# Patient Record
Sex: MALE | Race: WHITE | NOT HISPANIC OR LATINO | Employment: OTHER | URBAN - METROPOLITAN AREA
[De-identification: names, ages, dates, MRNs, and addresses within clinical notes are randomized per-mention and may not be internally consistent; named-entity substitution may affect disease eponyms.]

---

## 2023-07-11 LAB
ESTIMATED AVERAGE GLUCOSE FOR HBA1C: 197 MG/DL
HEMOGLOBIN A1C/HEMOGLOBIN TOTAL IN BLOOD: 8.5 %

## 2023-07-21 ENCOUNTER — OFFICE VISIT (OUTPATIENT)
Dept: PRIMARY CARE | Facility: CLINIC | Age: 78
End: 2023-07-21
Payer: MEDICARE

## 2023-07-21 VITALS — WEIGHT: 216 LBS | SYSTOLIC BLOOD PRESSURE: 120 MMHG | DIASTOLIC BLOOD PRESSURE: 84 MMHG | HEART RATE: 70 BPM

## 2023-07-21 DIAGNOSIS — B02.9 HERPES ZOSTER WITHOUT COMPLICATION: Primary | ICD-10-CM

## 2023-07-21 DIAGNOSIS — R21 RASH: ICD-10-CM

## 2023-07-21 PROBLEM — E78.5 HLD (HYPERLIPIDEMIA): Status: ACTIVE | Noted: 2023-07-21

## 2023-07-21 PROBLEM — I48.91 AF (ATRIAL FIBRILLATION) (MULTI): Status: ACTIVE | Noted: 2023-07-21

## 2023-07-21 PROBLEM — I10 HTN (HYPERTENSION): Status: ACTIVE | Noted: 2023-07-21

## 2023-07-21 PROBLEM — E11.9 DIABETES MELLITUS TYPE 2, UNCOMPLICATED (MULTI): Status: ACTIVE | Noted: 2023-07-21

## 2023-07-21 PROCEDURE — 3079F DIAST BP 80-89 MM HG: CPT | Performed by: STUDENT IN AN ORGANIZED HEALTH CARE EDUCATION/TRAINING PROGRAM

## 2023-07-21 PROCEDURE — 3074F SYST BP LT 130 MM HG: CPT | Performed by: STUDENT IN AN ORGANIZED HEALTH CARE EDUCATION/TRAINING PROGRAM

## 2023-07-21 PROCEDURE — 99214 OFFICE O/P EST MOD 30 MIN: CPT | Performed by: STUDENT IN AN ORGANIZED HEALTH CARE EDUCATION/TRAINING PROGRAM

## 2023-07-21 RX ORDER — APIXABAN 5 MG/1
5 TABLET, FILM COATED ORAL 2 TIMES DAILY
COMMUNITY

## 2023-07-21 RX ORDER — LANCETS 30 GAUGE
EACH MISCELLANEOUS
COMMUNITY
Start: 2023-03-16

## 2023-07-21 RX ORDER — BLOOD-GLUCOSE METER
EACH MISCELLANEOUS
COMMUNITY
Start: 2023-03-16

## 2023-07-21 RX ORDER — LANCETS 33 GAUGE
EACH MISCELLANEOUS
COMMUNITY
Start: 2023-07-10

## 2023-07-21 RX ORDER — METOPROLOL TARTRATE 25 MG/1
12.5 TABLET, FILM COATED ORAL 2 TIMES DAILY
COMMUNITY
End: 2023-11-28

## 2023-07-21 RX ORDER — INSULIN LISPRO 100 [IU]/ML
INJECTION, SUSPENSION SUBCUTANEOUS
COMMUNITY
End: 2024-05-02 | Stop reason: SDUPTHER

## 2023-07-21 RX ORDER — EMPAGLIFLOZIN 25 MG/1
1 TABLET, FILM COATED ORAL DAILY
COMMUNITY
Start: 2021-11-10 | End: 2024-03-25 | Stop reason: SDUPTHER

## 2023-07-21 RX ORDER — SIMVASTATIN 40 MG/1
TABLET, FILM COATED ORAL
COMMUNITY
Start: 2022-07-05 | End: 2023-10-16 | Stop reason: SDUPTHER

## 2023-07-21 RX ORDER — LISINOPRIL AND HYDROCHLOROTHIAZIDE 20; 25 MG/1; MG/1
TABLET ORAL
COMMUNITY
Start: 2023-07-21 | End: 2023-10-16 | Stop reason: SDUPTHER

## 2023-07-21 RX ORDER — VALACYCLOVIR HYDROCHLORIDE 1 G/1
1000 TABLET, FILM COATED ORAL 3 TIMES DAILY
Qty: 21 TABLET | Refills: 0 | Status: SHIPPED | OUTPATIENT
Start: 2023-07-21 | End: 2023-07-28

## 2023-07-21 RX ORDER — METFORMIN HYDROCHLORIDE 850 MG/1
850 TABLET ORAL 2 TIMES DAILY
COMMUNITY
End: 2024-04-25 | Stop reason: SDUPTHER

## 2023-07-21 RX ORDER — DIGOXIN 125 MCG
125 TABLET ORAL DAILY
COMMUNITY
End: 2024-01-18 | Stop reason: WASHOUT

## 2023-07-21 RX ORDER — DEXAMETHASONE 6 MG/1
TABLET ORAL DAILY
COMMUNITY
Start: 2022-08-12 | End: 2024-01-18 | Stop reason: WASHOUT

## 2023-07-21 RX ORDER — AMLODIPINE BESYLATE 5 MG/1
5 TABLET ORAL DAILY
COMMUNITY
End: 2023-10-03

## 2023-07-21 ASSESSMENT — PATIENT HEALTH QUESTIONNAIRE - PHQ9
1. LITTLE INTEREST OR PLEASURE IN DOING THINGS: NOT AT ALL
2. FEELING DOWN, DEPRESSED OR HOPELESS: NOT AT ALL
1. LITTLE INTEREST OR PLEASURE IN DOING THINGS: NOT AT ALL
SUM OF ALL RESPONSES TO PHQ9 QUESTIONS 1 AND 2: 0
2. FEELING DOWN, DEPRESSED OR HOPELESS: NOT AT ALL
SUM OF ALL RESPONSES TO PHQ9 QUESTIONS 1 AND 2: 0

## 2023-07-21 NOTE — PROGRESS NOTES
Subjective   Patient ID: Sean Khanna is a 77 y.o. male who presents for Rash (Possible shingles on left hip).    HPI   Patient is presenting to the office today to discuss signs/symptoms of what he believes is shingles    Stated over the past 48 hours, Wednesday night, he noted a slight rash starting to occur on his left lateral hip  Ultimately within the past 24-48 hours he has been noticing this becomes slightly vesicular, erythematous, and all noted within a few groups/vesicles    He is coming to the office today to discuss this further and now wondering if this could even be shingles and asking to further evaluate/treated    Review of Systems  All pertinent positive symptoms are included in the history of present illness.    All other systems have been reviewed and are negative and noncontributory to this patient's current ailments.    Objective   /84   Pulse 70   Wt 98 kg (216 lb)     Physical Exam  CONSTITUTIONAL - well nourished, well developed, looks like stated age, in no acute distress, not ill-appearing, and not tired appearing  SKIN - normal skin color and pigmentation, noted an erythematous and vesicular rash all located on 1 dermatome on the left lateral portion of hip, no open lesions at this time  HEAD - no trauma, normocephalic  EYES - normal external exam  CHEST -no distressed breathing, good effort  EXTREMITIES - no edema, no deformities  NEUROLOGICAL - normal balance, normal motor, no ataxia  PSYCHIATRIC - alert, pleasant and cordial, age-appropriate    Assessment/Plan   After examination this does appear to be shingles    You are contagious to those who have not yet had chickenpox, but shingles cannot be passed to other people    I would try to avoid contact with those who have not yet been vaccinated against chickenpox or who have not yet had the chickenpox disease until the shingles lesions scab over, at which point contagiousness will not be an issue; this is usually about 5-7 days  after noticing the rash for the first time    Risks, benefits, and options of treatment(s) were discussed and we opted for the treatment as noted above with instructions on the medication use for underlying medical ailment(s)    Today, I am going to provide you Valtrex to be used for the next 7 days to help encapsulate the virus and potentially decrease postherpetic neuralgia, or the pain that can be related to this type of infection    I would highly encourage supportive care such as rest, fluids and pain medication as warranted    Please return to the office in 7-10 days or sooner if symptoms worsen or persist as we will then further evaluate based on your symptoms

## 2023-07-26 ENCOUNTER — TELEPHONE (OUTPATIENT)
Dept: PRIMARY CARE | Facility: CLINIC | Age: 78
End: 2023-07-26
Payer: MEDICARE

## 2023-07-26 NOTE — TELEPHONE ENCOUNTER
Pt called in to let us know he did have a bowel movement and will let us know if anything changes.

## 2023-07-26 NOTE — TELEPHONE ENCOUNTER
Pt called in and has had what he thinks is an impacted colon since Sunday with an onset of severe bloating. Has only been able to pass gas but no bowel movement even after trying a laxative yesterday. Spoke with the pt about trying prune juice, miralax, stool softener and increase water intake. I did inform him if he does not have a bowel movement by Friday we would like him to go to the ER. He will call us back to update us on this situation.

## 2023-07-27 ENCOUNTER — TELEPHONE (OUTPATIENT)
Dept: PRIMARY CARE | Facility: CLINIC | Age: 78
End: 2023-07-27
Payer: MEDICARE

## 2023-07-27 NOTE — TELEPHONE ENCOUNTER
----- Message from Faviola Lombardo DO sent at 7/27/2023  1:14 PM EDT -----  Unfortunately, he will have to continue doing what he is doing as we are very limited since I put him on the medication for the shingles as well as due to his current medical history    I would recommend he continues trying to monitor and we will follow-up closely  ----- Message -----  From: Divina Kimball MA  Sent: 7/27/2023  12:05 PM EDT  To: Faviola Lombardo DO    Pt called in to let us know he had a dry heaving spell Sunday when trying to make a bowel movement. Due to this he strained his muscles pretty bad, has tried tylenol, muscle relaxer and a topical and has gotten no relief. Would you like to see him back in the office for this? He was just in on 7/21/23 for an unrelated topic.

## 2023-09-12 RX ORDER — INSULIN LISPRO 100 [IU]/ML
INJECTION, SUSPENSION SUBCUTANEOUS
Qty: 3 ML | Refills: 1 | OUTPATIENT
Start: 2023-09-12

## 2023-09-12 NOTE — TELEPHONE ENCOUNTER
I actually declined this medication as this patient sees Dr. Novak and should most likely get this from him as he just saw the patient back in July    Thank you

## 2023-09-23 DIAGNOSIS — I10 ESSENTIAL (PRIMARY) HYPERTENSION: ICD-10-CM

## 2023-10-03 RX ORDER — AMLODIPINE BESYLATE 5 MG/1
5 TABLET ORAL DAILY
Qty: 90 TABLET | Refills: 0 | Status: SHIPPED | OUTPATIENT
Start: 2023-10-03 | End: 2023-10-16 | Stop reason: SDUPTHER

## 2023-10-14 DIAGNOSIS — E78.5 HYPERLIPIDEMIA, UNSPECIFIED: ICD-10-CM

## 2023-10-16 ENCOUNTER — LAB (OUTPATIENT)
Dept: LAB | Facility: LAB | Age: 78
End: 2023-10-16
Payer: MEDICARE

## 2023-10-16 ENCOUNTER — ANCILLARY PROCEDURE (OUTPATIENT)
Dept: RADIOLOGY | Facility: CLINIC | Age: 78
End: 2023-10-16
Payer: MEDICARE

## 2023-10-16 ENCOUNTER — OFFICE VISIT (OUTPATIENT)
Dept: PRIMARY CARE | Facility: CLINIC | Age: 78
End: 2023-10-16
Payer: MEDICARE

## 2023-10-16 VITALS
SYSTOLIC BLOOD PRESSURE: 122 MMHG | DIASTOLIC BLOOD PRESSURE: 66 MMHG | HEIGHT: 71 IN | HEART RATE: 55 BPM | WEIGHT: 224 LBS | BODY MASS INDEX: 31.36 KG/M2

## 2023-10-16 DIAGNOSIS — Z79.4 TYPE 2 DIABETES MELLITUS WITHOUT COMPLICATION, WITH LONG-TERM CURRENT USE OF INSULIN (MULTI): ICD-10-CM

## 2023-10-16 DIAGNOSIS — I48.20 CHRONIC ATRIAL FIBRILLATION (MULTI): Primary | ICD-10-CM

## 2023-10-16 DIAGNOSIS — I48.20 CHRONIC ATRIAL FIBRILLATION (MULTI): ICD-10-CM

## 2023-10-16 DIAGNOSIS — E78.2 MIXED HYPERLIPIDEMIA: ICD-10-CM

## 2023-10-16 DIAGNOSIS — I10 PRIMARY HYPERTENSION: ICD-10-CM

## 2023-10-16 DIAGNOSIS — G89.29 CHRONIC RIGHT SHOULDER PAIN: ICD-10-CM

## 2023-10-16 DIAGNOSIS — I10 ESSENTIAL (PRIMARY) HYPERTENSION: ICD-10-CM

## 2023-10-16 DIAGNOSIS — E11.9 TYPE 2 DIABETES MELLITUS WITHOUT COMPLICATION, WITH LONG-TERM CURRENT USE OF INSULIN (MULTI): ICD-10-CM

## 2023-10-16 DIAGNOSIS — M25.511 CHRONIC RIGHT SHOULDER PAIN: ICD-10-CM

## 2023-10-16 LAB
ALBUMIN SERPL BCP-MCNC: 4.3 G/DL (ref 3.4–5)
ALP SERPL-CCNC: 82 U/L (ref 33–136)
ALT SERPL W P-5'-P-CCNC: 19 U/L (ref 10–52)
ANION GAP SERPL CALC-SCNC: 14 MMOL/L (ref 10–20)
AST SERPL W P-5'-P-CCNC: 16 U/L (ref 9–39)
BILIRUB SERPL-MCNC: 0.6 MG/DL (ref 0–1.2)
BUN SERPL-MCNC: 36 MG/DL (ref 6–23)
CALCIUM SERPL-MCNC: 9.8 MG/DL (ref 8.6–10.3)
CHLORIDE SERPL-SCNC: 103 MMOL/L (ref 98–107)
CO2 SERPL-SCNC: 28 MMOL/L (ref 21–32)
CREAT SERPL-MCNC: 1.41 MG/DL (ref 0.5–1.3)
EST. AVERAGE GLUCOSE BLD GHB EST-MCNC: 166 MG/DL
GFR SERPL CREATININE-BSD FRML MDRD: 52 ML/MIN/1.73M*2
GLUCOSE SERPL-MCNC: 90 MG/DL (ref 74–99)
HBA1C MFR BLD: 7.4 %
POTASSIUM SERPL-SCNC: 5.4 MMOL/L (ref 3.5–5.3)
PROT SERPL-MCNC: 7.4 G/DL (ref 6.4–8.2)
SODIUM SERPL-SCNC: 140 MMOL/L (ref 136–145)

## 2023-10-16 PROCEDURE — 73030 X-RAY EXAM OF SHOULDER: CPT | Mod: RIGHT SIDE | Performed by: RADIOLOGY

## 2023-10-16 PROCEDURE — 80053 COMPREHEN METABOLIC PANEL: CPT

## 2023-10-16 PROCEDURE — 1159F MED LIST DOCD IN RCRD: CPT | Performed by: STUDENT IN AN ORGANIZED HEALTH CARE EDUCATION/TRAINING PROGRAM

## 2023-10-16 PROCEDURE — 80061 LIPID PANEL: CPT

## 2023-10-16 PROCEDURE — 3078F DIAST BP <80 MM HG: CPT | Performed by: STUDENT IN AN ORGANIZED HEALTH CARE EDUCATION/TRAINING PROGRAM

## 2023-10-16 PROCEDURE — 3074F SYST BP LT 130 MM HG: CPT | Performed by: STUDENT IN AN ORGANIZED HEALTH CARE EDUCATION/TRAINING PROGRAM

## 2023-10-16 PROCEDURE — 99214 OFFICE O/P EST MOD 30 MIN: CPT | Performed by: STUDENT IN AN ORGANIZED HEALTH CARE EDUCATION/TRAINING PROGRAM

## 2023-10-16 PROCEDURE — 36415 COLL VENOUS BLD VENIPUNCTURE: CPT

## 2023-10-16 PROCEDURE — 83036 HEMOGLOBIN GLYCOSYLATED A1C: CPT

## 2023-10-16 PROCEDURE — 73030 X-RAY EXAM OF SHOULDER: CPT | Mod: RT

## 2023-10-16 RX ORDER — AMLODIPINE BESYLATE 5 MG/1
5 TABLET ORAL DAILY
Qty: 90 TABLET | Refills: 1 | Status: SHIPPED | OUTPATIENT
Start: 2023-10-16 | End: 2024-02-13 | Stop reason: SDUPTHER

## 2023-10-16 RX ORDER — SIMVASTATIN 40 MG/1
40 TABLET, FILM COATED ORAL NIGHTLY
Qty: 90 TABLET | Refills: 1 | Status: SHIPPED | OUTPATIENT
Start: 2023-10-16 | End: 2024-02-13 | Stop reason: SDUPTHER

## 2023-10-16 RX ORDER — SIMVASTATIN 40 MG/1
40 TABLET, FILM COATED ORAL NIGHTLY
Qty: 90 TABLET | Refills: 1 | OUTPATIENT
Start: 2023-10-16

## 2023-10-16 RX ORDER — LISINOPRIL AND HYDROCHLOROTHIAZIDE 20; 25 MG/1; MG/1
1 TABLET ORAL DAILY
Qty: 90 TABLET | Refills: 1 | Status: SHIPPED | OUTPATIENT
Start: 2023-10-16 | End: 2024-02-13 | Stop reason: SDUPTHER

## 2023-10-16 ASSESSMENT — PATIENT HEALTH QUESTIONNAIRE - PHQ9
1. LITTLE INTEREST OR PLEASURE IN DOING THINGS: NOT AT ALL
2. FEELING DOWN, DEPRESSED OR HOPELESS: NOT AT ALL
SUM OF ALL RESPONSES TO PHQ9 QUESTIONS 1 AND 2: 0

## 2023-10-16 NOTE — PROGRESS NOTES
Subjective   Patient ID: Sean Khanna is a 77 y.o. male who presents for 6 month follow up  Today he is accompanied by alone.     HPI     1. Diabetes Mellitus Type II   Takes Jardiance 25 mg, Metformin 850 twice a day, and humalog   Does not monitor blood sugars at home  He will follow-up with endocrinology to manage his diabetes starting next month  Denies polyuria, polydipsia, numbness or tingling      2. Atrial Fibrillation  Discontinue digoxin 125 mcg but continues to take metoprolol 12.5 mg twice a day   Follows with Dr. Ivory (cardiology), [facility]  Denies chest pain, chest pressure, heart palpitations or any other acute cardiopulmonary symptoms      3. Hyperlipidemia   Takes Simvastatin 40 mg   Denies chest pain, chest pressure, shortness of breath.  Willing and wishing get lab work performed     4. Hypertension   Takes Metoprolol 12.5 mg twice a day, amlodipine 5 mg, and Lisinopril-HCTZ 20-25 mg   Blood pressure is stable in office at 122/66  Denies chest pain, lightheadedness, dizziness or any other acute cardiopulmonary symptoms     5.  Right shoulder pain  Has a longstanding history of right-sided shoulder pain  States that when he exercises and certain motions he feels very well  Ultimately, notices pain worsen at night when he is sleeping  Wish to discuss this further and even wondering if imaging would be appropriate      Current Outpatient Medications on File Prior to Visit   Medication Sig Dispense Refill    dexAMETHasone (Decadron) 6 mg tablet Take by mouth once daily.      digoxin (Lanoxin) 125 MCG tablet Take 1 tablet (125 mcg) by mouth once daily.      Eliquis 5 mg tablet Take 1 tablet (5 mg) by mouth 2 times a day.      HumaLOG Mix 75-25 KwikPen 100 unit/mL (75-25) injection INJECT 42 UNIT TWICE DAILY      Jardiance 25 mg Take 1 tablet (25 mg) by mouth once daily.      metFORMIN (Glucophage) 850 mg tablet Take 1 tablet (850 mg) by mouth 2 times a day.      metoprolol tartrate  "(Lopressor) 25 mg tablet Take 0.5 tablets (12.5 mg) by mouth 2 times a day.      OneTouch Delica Plus Lancet 30 gauge misc FOR GLUCOSE TESTING 3 TIMES DAILY      OneTouch Verio Flex meter misc FOR GLUCOSE TESTING 3 TIMES DAILY      OneTouch Verio test strips strip TEST 3 TIMES DAILY.      [DISCONTINUED] amLODIPine (Norvasc) 5 mg tablet TAKE 1 TABLET BY MOUTH EVERY DAY 90 tablet 0    [DISCONTINUED] lisinopriL-hydrochlorothiazide 20-25 mg tablet       [DISCONTINUED] simvastatin (Zocor) 40 mg tablet Take by mouth.       No current facility-administered medications on file prior to visit.        No Known Allergies    Immunization History   Administered Date(s) Administered    Influenza, Seasonal, Quadrivalent, Adjuvanted 10/02/2023    Influenza, seasonal, injectable 01/02/2023    Moderna COVID-19 vaccine, bivalent, blue cap/gray label *Check age/dose* 11/10/2022    Moderna SARS-CoV-2 Vaccination 02/22/2021, 03/22/2021, 11/16/2021    Pfizer COVID-19 vaccine, Fall 2023, 12 years and older, (30mcg/0.3mL) 10/05/2023    Pfizer Purple Cap SARS-CoV-2 10/05/2023    Pneumococcal conjugate vaccine, 13-valent (PREVNAR 13) 10/02/2018    Pneumococcal polysaccharide vaccine, 23-valent, age 2 years and older (PNEUMOVAX 23) 12/10/2019         Review of Systems  All pertinent positive symptoms are included in the history of present illness.  All other systems have been reviewed and are negative and noncontributory to this patient's current ailments.     Objective   /66 (BP Location: Left arm, Patient Position: Sitting, BP Cuff Size: Adult)   Pulse 55   Ht 1.803 m (5' 11\")   Wt 102 kg (224 lb)   BMI 31.24 kg/m²   BSA: 2.26 meters squared  No visits with results within 1 Month(s) from this visit.   Latest known visit with results is:   Orders Only on 07/10/2023   Component Date Value Ref Range Status    Hemoglobin A1C 07/10/2023 8.5 (A)  % Final    Comment:      Diagnosis of Diabetes-Adults   Non-Diabetic: < or = 5.6%   " Increased risk for developing diabetes: 5.7-6.4%   Diagnostic of diabetes: > or = 6.5%  .       Monitoring of Diabetes                Age (y)     Therapeutic Goal (%)   Adults:          >18           <7.0   Pediatrics:    13-18           <7.5                   7-12           <8.0                   0- 6            7.5-8.5   American Diabetes Association. Diabetes Care 33(S1), Jan 2010.      Estimated Average Glucose 07/10/2023 197  MG/DL Final       Physical Exam  CONSTITUTIONAL - well nourished, well developed, looks like stated age, in no acute distress, not ill-appearing, and not tired appearing  SKIN - normal skin color and pigmentation  HEAD - no trauma, normocephalic  EYES - pupils are equal and reactive to light  NECK - supple without rigidity  CHEST - clear to auscultation, no wheezing, no crackles and no rales, good effort  CARDIAC - regular rate and regular rhythm, no skipped beats, no murmur  ABDOMEN - no organomegaly, soft, nontender, nondistended  EXTREMITIES - no edema, no deformities  NEUROLOGICAL - normal gait, normal balance, normal motor  PSYCHIATRIC - alert, pleasant and cordial, age-appropriate  IMMUNOLOGIC - no cervical lymphadenopathy     Assessment/Plan   1. Diabetes Mellitus Type II   Continue Jardiance 25 mg, Metformin 850 twice a day, and humalog  Continue to follow up with endocrinologist  We also ordered a hemoglobin A1c and we will notify of the results and make recommendations accordingly     2. Atrial Fibrillation  Continue Metoprolol 12.5 mg twice a day   Follow with Dr. Ivory as scheduled  I would like to have you monitor and record blood pressures at home   Blood pressure goal should be below 130/80, ideally 120/80  I also prefer that you get your digoxin from your cardiologist in the future     3. Hyperlipidemia   Continue Simvastatin 40 mg   I have provided you with a requisition for a lipid panel   I will notify you of the results and make treatment recommendations accordingly       4. Hypertension   Continue Metoprolol 12.5 mg twice a day, amlodipine 5 mg and Lisinopril-HCTZ 20-25 mg   I would like to have you monitor and record blood pressures at home   Blood pressure goal should be below 130/80, ideally 120/80    5.  Right shoulder pain  Continue exercising as tolerated  I did order an x-ray and will notify the results and make corrections accordingly  Otherwise, I would also recommend that you continue monitoring if this worsens we will discuss a cortisone injection and/or a physical therapy referral     Please follow up with the office for any further questions or concerns  Otherwise, please follow-up in 6 months for continued care as well as your Medicare annual wellness examination

## 2023-10-17 ENCOUNTER — LAB (OUTPATIENT)
Dept: LAB | Facility: LAB | Age: 78
End: 2023-10-17
Payer: MEDICARE

## 2023-10-17 DIAGNOSIS — E87.5 HYPERKALEMIA: Primary | ICD-10-CM

## 2023-10-17 DIAGNOSIS — E87.5 HYPERKALEMIA: ICD-10-CM

## 2023-10-17 LAB
ALBUMIN SERPL BCP-MCNC: 4.2 G/DL (ref 3.4–5)
ALP SERPL-CCNC: 83 U/L (ref 33–136)
ALT SERPL W P-5'-P-CCNC: 16 U/L (ref 10–52)
ANION GAP SERPL CALC-SCNC: 16 MMOL/L (ref 10–20)
AST SERPL W P-5'-P-CCNC: 14 U/L (ref 9–39)
BILIRUB SERPL-MCNC: 0.8 MG/DL (ref 0–1.2)
BUN SERPL-MCNC: 31 MG/DL (ref 6–23)
CALCIUM SERPL-MCNC: 9.4 MG/DL (ref 8.6–10.3)
CHLORIDE SERPL-SCNC: 100 MMOL/L (ref 98–107)
CHOLEST SERPL-MCNC: 111 MG/DL
CHOLESTEROL/HDL RATIO: 1.9
CO2 SERPL-SCNC: 26 MMOL/L (ref 21–32)
CREAT SERPL-MCNC: 1.37 MG/DL (ref 0.5–1.3)
GFR SERPL CREATININE-BSD FRML MDRD: 53 ML/MIN/1.73M*2
GLUCOSE SERPL-MCNC: 135 MG/DL (ref 74–99)
HDLC SERPL-MCNC: 59.5 MG/DL
LDLC SERPL CALC-MCNC: 39 MG/DL
NON HDL CHOLESTEROL: 52 MG/DL (ref 0–149)
POTASSIUM SERPL-SCNC: 4.5 MMOL/L (ref 3.5–5.3)
PROT SERPL-MCNC: 7.6 G/DL (ref 6.4–8.2)
SODIUM SERPL-SCNC: 137 MMOL/L (ref 136–145)
TRIGL SERPL-MCNC: 61 MG/DL
VLDL: 12 MG/DL (ref 0–40)

## 2023-10-17 PROCEDURE — 80053 COMPREHEN METABOLIC PANEL: CPT

## 2023-10-17 PROCEDURE — 36415 COLL VENOUS BLD VENIPUNCTURE: CPT

## 2023-10-17 NOTE — RESULT ENCOUNTER NOTE
Potassium slightly elevated 5.4 and also kidney function slightly decreased at 52%  BUN and creatinine both elevated at 36 and 1.41 respectively    This is pointing towards dehydration    Liver function is within normal limits    Hemoglobin A1c is improving down to 7.4% from 8.5%    Lipid panel looks great at 111, HDL 59, LDL 39, triglycerides 61    Due to the potassium level noted above, and due to his heart history, I would recommend he repeat a CMP within the next few days

## 2023-10-18 ENCOUNTER — TELEPHONE (OUTPATIENT)
Dept: PRIMARY CARE | Facility: CLINIC | Age: 78
End: 2023-10-18
Payer: MEDICARE

## 2023-10-18 NOTE — RESULT ENCOUNTER NOTE
X-ray of right shoulder showed mild AC joint osteoarthrosis but otherwise unremarkable right shoulder    The next step would be discussing a cortisone injection or even physical therapy    Please advise

## 2023-10-18 NOTE — TELEPHONE ENCOUNTER
----- Message from Faviola Lombardo DO sent at 10/17/2023  4:43 PM EDT -----  Potassium well within normal limits and kidney function look stable      Spoke w/pt, pt aware of results

## 2023-10-18 NOTE — TELEPHONE ENCOUNTER
----- Message from Faviola Lombardo DO sent at 10/18/2023  6:37 AM EDT -----  X-ray of right shoulder showed mild AC joint osteoarthrosis but otherwise unremarkable right shoulder    The next step would be discussing a cortisone injection or even physical therapy    Please advise    Spoke w/pt, pt aware of results

## 2023-10-31 ENCOUNTER — APPOINTMENT (OUTPATIENT)
Dept: DERMATOLOGY | Facility: CLINIC | Age: 78
End: 2023-10-31
Payer: MEDICARE

## 2023-11-06 ENCOUNTER — OFFICE VISIT (OUTPATIENT)
Dept: DERMATOLOGY | Facility: CLINIC | Age: 78
End: 2023-11-06
Payer: MEDICARE

## 2023-11-06 DIAGNOSIS — L72.0 EPIDERMAL CYST: ICD-10-CM

## 2023-11-06 DIAGNOSIS — L85.8 CUTANEOUS HORN: ICD-10-CM

## 2023-11-06 PROCEDURE — 11104 PUNCH BX SKIN SINGLE LESION: CPT | Performed by: SPECIALIST

## 2023-11-06 PROCEDURE — 3074F SYST BP LT 130 MM HG: CPT | Performed by: SPECIALIST

## 2023-11-06 PROCEDURE — 88305 TISSUE EXAM BY PATHOLOGIST: CPT | Performed by: DERMATOLOGY

## 2023-11-06 PROCEDURE — 88304 TISSUE EXAM BY PATHOLOGIST: CPT | Performed by: DERMATOLOGY

## 2023-11-06 PROCEDURE — 3078F DIAST BP <80 MM HG: CPT | Performed by: SPECIALIST

## 2023-11-06 PROCEDURE — 88304 TISSUE EXAM BY PATHOLOGIST: CPT | Mod: TC,DER | Performed by: SPECIALIST

## 2023-11-06 PROCEDURE — 1159F MED LIST DOCD IN RCRD: CPT | Performed by: SPECIALIST

## 2023-11-06 PROCEDURE — 11300 SHAVE SKIN LESION 0.5 CM/<: CPT | Performed by: SPECIALIST

## 2023-11-06 NOTE — PROGRESS NOTES
Subjective   HPI: Sean Khanna is a 77 y.o. male is here for evaluation and treatment of a spot on my back and a spot on my lip.    ROS: No other skin or systemic complaints other than what is documented elsewhere in the note.    ALLERGIES: Patient has no known allergies.    SOCIAL:  reports that he has never smoked. He has never used smokeless tobacco. No history on file for alcohol use and drug use.    Objective     Description: Patient has an inflamed crusted plaque in a heavily photodamaged area left upper back.  Diagnostic considerations would include irritated seborrheic keratosis or squamous cell carcinoma.  Patient also has apartially inflamed cyst involving his right upper lip.    Assessment/Plan   1. Epidermal cyst  Right Upper Cutaneous Lip    Skin biopsy - Right Upper Cutaneous Lip    Specimen 1 - Dermatopathology- DERM LAB  Differential Diagnosis: Epidermal Cyst   Check Margins Yes/No?:    Comments:    Dermpath Lab: Routine Histopathology (formalin-fixed tissue)    2. Cutaneous horn  Left Posterior Shoulder    Shave removal - Left Posterior Shoulder    Specimen 2 - Dermatopathology- DERM LAB  Differential Diagnosis: Cutaneous Horn   Check Margins Yes/No?:    Comments:    Dermpath Lab: Routine Histopathology (formalin-fixed tissue)         FOLLOW UP: 1 week for suture removal.    The patient was encouraged to contact me with any further questions or concerns.  Edmund Vail MD  11/6/2023

## 2023-11-06 NOTE — PROGRESS NOTES
Subjective     Sean Khanna is a 77 y.o. male who presents for the following: Cyst (Right Upper Lip /).     Review of Systems:  No other skin or systemic complaints other than what is documented elsewhere in the note.    The following portions of the chart were reviewed this encounter and updated as appropriate:          Skin Cancer History  No skin cancer on file.      Specialty Problems    None       Objective   Well appearing patient in no apparent distress; mood and affect are within normal limits.    A focused skin examination was performed. All findings within normal limits unless otherwise noted below.    Assessment/Plan   1. Epidermal cyst  Right Upper Cutaneous Lip    Skin biopsy - Right Upper Cutaneous Lip  Type of biopsy: punch    Informed consent: discussed and consent obtained    Timeout: patient name, date of birth, surgical site, and procedure verified    Procedure prep:  Patient was prepped and draped  Anesthesia: the lesion was anesthetized in a standard fashion    Anesthetic:  1% lidocaine w/ epinephrine 1-100,000 local infiltration  Punch size:  4 mm  Suture size:  4-0  Suture type: Prolene (polypropylene)    Suture removal (days):  14  Hemostasis achieved with: suture    Outcome: patient tolerated procedure well    Post-procedure details: sterile dressing applied and wound care instructions given    Dressing type: bandage and petrolatum      Specimen 1 - Dermatopathology- DERM LAB  Differential Diagnosis: Epidermal Cyst   Check Margins Yes/No?:    Comments:    Dermpath Lab: Routine Histopathology (formalin-fixed tissue)    2. Cutaneous horn  Left Posterior Shoulder    Shave removal - Left Posterior Shoulder    Informed consent: discussed and consent obtained    Timeout: patient name, date of birth, surgical site, and procedure verified    Procedure prep:  Patient was prepped and draped  Anesthesia: the lesion was anesthetized in a standard fashion    Anesthetic:  1% lidocaine w/ epinephrine  1-100,000 local infiltration  Instrument used: #15 blade    Hemostasis achieved with: aluminum chloride    Outcome: patient tolerated procedure well    Post-procedure details: sterile dressing applied and wound care instructions given    Dressing type: bandage and petrolatum      Specimen 2 - Dermatopathology- DERM LAB  Differential Diagnosis: Cutaneous Horn   Check Margins Yes/No?:    Comments:    Dermpath Lab: Routine Histopathology (formalin-fixed tissue)

## 2023-11-08 LAB
LABORATORY COMMENT REPORT: NORMAL
PATH REPORT.FINAL DX SPEC: NORMAL
PATH REPORT.GROSS SPEC: NORMAL
PATH REPORT.MICROSCOPIC SPEC OTHER STN: NORMAL
PATH REPORT.RELEVANT HX SPEC: NORMAL
PATH REPORT.TOTAL CANCER: NORMAL

## 2023-11-11 DIAGNOSIS — I48.91 UNSPECIFIED ATRIAL FIBRILLATION (MULTI): ICD-10-CM

## 2023-11-13 ENCOUNTER — OFFICE VISIT (OUTPATIENT)
Dept: DERMATOLOGY | Facility: CLINIC | Age: 78
End: 2023-11-13
Payer: MEDICARE

## 2023-11-13 DIAGNOSIS — Z48.02 VISIT FOR SUTURE REMOVAL: ICD-10-CM

## 2023-11-13 PROCEDURE — 1159F MED LIST DOCD IN RCRD: CPT | Performed by: SPECIALIST

## 2023-11-13 PROCEDURE — 3078F DIAST BP <80 MM HG: CPT | Performed by: SPECIALIST

## 2023-11-13 PROCEDURE — 3074F SYST BP LT 130 MM HG: CPT | Performed by: SPECIALIST

## 2023-11-13 NOTE — PROGRESS NOTES
Subjective   HPI: Sean Khanna is a 77 y.o. male is here for suture removal.    ROS: No other skin or systemic complaints other than what is documented elsewhere in the note.    ALLERGIES: Patient has no known allergies.    SOCIAL:  reports that he has never smoked. He has never used smokeless tobacco. No history on file for alcohol use and drug use.    Objective     Description: Sutures removed.  This wound is healing very nicely.  There is no sign of infection.    Assessment/Plan   1. Visit for suture removal  Right Upper Cutaneous Lip         FOLLOW UP: As needed.    The patient was encouraged to contact me with any further questions or concerns.  Edmund Vail MD  11/13/2023

## 2023-11-26 DIAGNOSIS — E11.9 TYPE 2 DIABETES MELLITUS WITHOUT COMPLICATIONS (MULTI): ICD-10-CM

## 2023-11-28 RX ORDER — METOPROLOL TARTRATE 25 MG/1
12.5 TABLET, FILM COATED ORAL 2 TIMES DAILY
Qty: 90 TABLET | Refills: 1 | Status: SHIPPED | OUTPATIENT
Start: 2023-11-28 | End: 2024-02-13 | Stop reason: SDUPTHER

## 2023-11-28 RX ORDER — PEN NEEDLE, DIABETIC 32GX 5/32"
NEEDLE, DISPOSABLE MISCELLANEOUS
Qty: 100 EACH | Refills: 1 | Status: SHIPPED | OUTPATIENT
Start: 2023-11-28 | End: 2023-12-22

## 2023-12-21 DIAGNOSIS — E11.9 TYPE 2 DIABETES MELLITUS WITHOUT COMPLICATIONS (MULTI): ICD-10-CM

## 2023-12-22 RX ORDER — PEN NEEDLE, DIABETIC 32GX 5/32"
NEEDLE, DISPOSABLE MISCELLANEOUS
Qty: 300 EACH | Refills: 1 | Status: SHIPPED | OUTPATIENT
Start: 2023-12-22

## 2024-01-18 ENCOUNTER — OFFICE VISIT (OUTPATIENT)
Dept: DERMATOLOGY | Facility: CLINIC | Age: 79
End: 2024-01-18
Payer: MEDICARE

## 2024-01-18 ENCOUNTER — LAB (OUTPATIENT)
Dept: LAB | Facility: LAB | Age: 79
End: 2024-01-18
Payer: MEDICARE

## 2024-01-18 ENCOUNTER — OFFICE VISIT (OUTPATIENT)
Dept: ENDOCRINOLOGY | Facility: CLINIC | Age: 79
End: 2024-01-18
Payer: MEDICARE

## 2024-01-18 VITALS
HEIGHT: 73 IN | HEART RATE: 57 BPM | WEIGHT: 241.4 LBS | SYSTOLIC BLOOD PRESSURE: 159 MMHG | RESPIRATION RATE: 20 BRPM | DIASTOLIC BLOOD PRESSURE: 77 MMHG | BODY MASS INDEX: 31.99 KG/M2

## 2024-01-18 DIAGNOSIS — E11.9 TYPE 2 DIABETES MELLITUS WITHOUT COMPLICATION, WITH LONG-TERM CURRENT USE OF INSULIN (MULTI): ICD-10-CM

## 2024-01-18 DIAGNOSIS — Z79.4 TYPE 2 DIABETES MELLITUS WITHOUT COMPLICATION, WITH LONG-TERM CURRENT USE OF INSULIN (MULTI): ICD-10-CM

## 2024-01-18 DIAGNOSIS — Z79.4 TYPE 2 DIABETES MELLITUS WITHOUT COMPLICATION, WITH LONG-TERM CURRENT USE OF INSULIN (MULTI): Primary | ICD-10-CM

## 2024-01-18 DIAGNOSIS — E11.9 TYPE 2 DIABETES MELLITUS WITHOUT COMPLICATION, WITH LONG-TERM CURRENT USE OF INSULIN (MULTI): Primary | ICD-10-CM

## 2024-01-18 DIAGNOSIS — L21.9 SEBORRHEIC DERMATITIS: ICD-10-CM

## 2024-01-18 DIAGNOSIS — L72.0 EPIDERMAL CYST: ICD-10-CM

## 2024-01-18 PROCEDURE — 87070 CULTURE OTHR SPECIMN AEROBIC: CPT | Performed by: SPECIALIST

## 2024-01-18 PROCEDURE — 1036F TOBACCO NON-USER: CPT | Performed by: INTERNAL MEDICINE

## 2024-01-18 PROCEDURE — 11900 INJECT SKIN LESIONS </W 7: CPT | Performed by: SPECIALIST

## 2024-01-18 PROCEDURE — 1036F TOBACCO NON-USER: CPT | Performed by: SPECIALIST

## 2024-01-18 PROCEDURE — 1159F MED LIST DOCD IN RCRD: CPT | Performed by: SPECIALIST

## 2024-01-18 PROCEDURE — 10061 I&D ABSCESS COMP/MULTIPLE: CPT | Performed by: SPECIALIST

## 2024-01-18 PROCEDURE — 3077F SYST BP >= 140 MM HG: CPT | Performed by: INTERNAL MEDICINE

## 2024-01-18 PROCEDURE — 1126F AMNT PAIN NOTED NONE PRSNT: CPT | Performed by: SPECIALIST

## 2024-01-18 PROCEDURE — 99213 OFFICE O/P EST LOW 20 MIN: CPT | Performed by: SPECIALIST

## 2024-01-18 PROCEDURE — 1159F MED LIST DOCD IN RCRD: CPT | Performed by: INTERNAL MEDICINE

## 2024-01-18 PROCEDURE — 83036 HEMOGLOBIN GLYCOSYLATED A1C: CPT

## 2024-01-18 PROCEDURE — 1126F AMNT PAIN NOTED NONE PRSNT: CPT | Performed by: INTERNAL MEDICINE

## 2024-01-18 PROCEDURE — 99214 OFFICE O/P EST MOD 30 MIN: CPT | Performed by: INTERNAL MEDICINE

## 2024-01-18 PROCEDURE — 3078F DIAST BP <80 MM HG: CPT | Performed by: INTERNAL MEDICINE

## 2024-01-18 RX ORDER — KETOCONAZOLE 20 MG/ML
SHAMPOO, SUSPENSION TOPICAL 3 TIMES WEEKLY
Qty: 120 ML | Refills: 11 | Status: SHIPPED | OUTPATIENT
Start: 2024-01-19

## 2024-01-18 RX ORDER — DOXYCYCLINE 100 MG/1
100 CAPSULE ORAL DAILY
Qty: 10 CAPSULE | Refills: 0 | Status: SHIPPED | OUTPATIENT
Start: 2024-01-18 | End: 2024-01-28

## 2024-01-18 ASSESSMENT — ENCOUNTER SYMPTOMS
DIARRHEA: 0
ENDOCRINE COMMENTS: AS ABOVE
FREQUENCY: 0
ARTHRALGIAS: 1
CONSTIPATION: 0
VOMITING: 0
HEADACHES: 0
SHORTNESS OF BREATH: 0
DIFFICULTY URINATING: 0
NAUSEA: 0
UNEXPECTED WEIGHT CHANGE: 1

## 2024-01-18 ASSESSMENT — PATIENT HEALTH QUESTIONNAIRE - PHQ9
SUM OF ALL RESPONSES TO PHQ9 QUESTIONS 1 AND 2: 0
1. LITTLE INTEREST OR PLEASURE IN DOING THINGS: NOT AT ALL
2. FEELING DOWN, DEPRESSED OR HOPELESS: NOT AT ALL

## 2024-01-18 ASSESSMENT — COLUMBIA-SUICIDE SEVERITY RATING SCALE - C-SSRS
1. IN THE PAST MONTH, HAVE YOU WISHED YOU WERE DEAD OR WISHED YOU COULD GO TO SLEEP AND NOT WAKE UP?: NO
6. HAVE YOU EVER DONE ANYTHING, STARTED TO DO ANYTHING, OR PREPARED TO DO ANYTHING TO END YOUR LIFE?: NO
2. HAVE YOU ACTUALLY HAD ANY THOUGHTS OF KILLING YOURSELF?: NO

## 2024-01-18 ASSESSMENT — PAIN SCALES - GENERAL: PAINLEVEL: 0-NO PAIN

## 2024-01-18 NOTE — PROGRESS NOTES
Subjective     Sean Khanna is a 78 y.o. male who presents for the following: Suspicious Skin Lesion (Bump on Left Anterior Neck).     Review of Systems:  No other skin or systemic complaints other than what is documented elsewhere in the note.    The following portions of the chart were reviewed this encounter and updated as appropriate:          Skin Cancer History  No skin cancer on file.      Specialty Problems    None       Objective   Well appearing patient in no apparent distress; mood and affect are within normal limits.    A focused skin examination was performed. All findings within normal limits unless otherwise noted below.    Assessment/Plan   1. Epidermal cyst  Neck - Anterior    2. Seborrheic dermatitis  Scalp

## 2024-01-18 NOTE — LETTER
January 18, 2024     Faviola Lombardo DO  [address]  Formerly named [facility] [address]  [facility] [address]    Patient: Sean Khanna   YOB: 1945   Date of Visit: 1/18/2024       Dear Dr. Faviola Lombardo DO:    Thank you for referring Sean Khanna to me for evaluation. Below are my notes for this consultation.  If you have questions, please do not hesitate to call me. I look forward to following your patient along with you.       Sincerely,     Nitin Novak MD      CC: No Recipients  ______________________________________________________________________________________    History Of Present Illness  Sean Khanna is a 78 y.o. male     Duration of type 2 diabetes mellitus:  6 years  Complications:  Coronary artery disease    Humalog Mix 75/25  Breakfast 42 units  Dinner 42 units     Jardiance 25 mg/day  Metformin 850 mg--he stopped evening dose 1 month ago due to nocturnal hypoglycemia    FreeStyle Neyda 2  Patient is testing glucose 288 times daily  Records reviewed, on file    Last eye exam:      Past Medical History  He has no past medical history on file.    Surgical History  He has a past surgical history that includes Other surgical history (11/18/2022).     Social History  He reports that he has never smoked. He has never used smokeless tobacco. He reports that he does not drink alcohol and does not use drugs.    Family History  No family history on file.    Medications  Current Outpatient Medications   Medication Instructions   • amLODIPine (NORVASC) 5 mg, oral, Daily   • Eliquis 5 mg, oral, 2 times daily   • HumaLOG Mix 75-25 KwikPen 100 unit/mL (75-25) injection INJECT 42 UNIT TWICE DAILY   • Jardiance 25 mg 1 tablet, oral, Daily   • lisinopriL-hydrochlorothiazide 20-25 mg tablet 1 tablet, oral, Daily   • metFORMIN (GLUCOPHAGE) 850 mg, oral, 2 times daily   • metoprolol tartrate (LOPRESSOR) 12.5 mg, oral, 2 times daily   • OneTouch Delica Plus Lancet 30 gauge misc FOR  "GLUCOSE TESTING 3 TIMES DAILY   • OneTouch Verio Flex meter misc FOR GLUCOSE TESTING 3 TIMES DAILY   • OneTouch Verio test strips strip TEST 3 TIMES DAILY.   • pen needle, diabetic (BD Gina 2nd Gen Pen Needle) 32 gauge x 5/32\" needle USE AS DIRECTED   • simvastatin (ZOCOR) 40 mg, oral, Nightly       Allergies  Patient has no known allergies.    Review of Systems   Constitutional:  Positive for unexpected weight change (gain 30 lbs).   Eyes:  Negative for visual disturbance.   Respiratory:  Negative for shortness of breath.    Cardiovascular:  Negative for chest pain.   Gastrointestinal:  Negative for constipation, diarrhea, nausea and vomiting.   Endocrine:        As above   Genitourinary:  Negative for difficulty urinating and frequency.   Musculoskeletal:  Positive for arthralgias (shoulder).   Neurological:  Negative for headaches.         Last Recorded Vitals  Blood pressure 159/77, pulse 57, resp. rate 20, height 1.854 m (6' 1\"), weight 110 kg (241 lb 6.5 oz).    Physical Exam  Constitutional:       General: He is not in acute distress.  HENT:      Head: Normocephalic.      Mouth/Throat:      Mouth: Mucous membranes are moist.   Eyes:      Extraocular Movements: Extraocular movements intact.   Neck:      Thyroid: No thyromegaly.   Cardiovascular:      Pulses:           Radial pulses are 2+ on the right side and 2+ on the left side.   Musculoskeletal:      Right lower leg: No edema.      Left lower leg: No edema.   Lymphadenopathy:      Cervical: No cervical adenopathy.   Neurological:      Mental Status: He is alert.      Motor: No tremor.   Psychiatric:         Mood and Affect: Affect normal.          Relevant Results  Glucose (mg/dL)   Date Value   10/17/2023 135 (H)   10/16/2023 90   02/16/2023 82     Hemoglobin A1C (%)   Date Value   10/16/2023 7.4 (H)   07/10/2023 8.5 (A)   02/16/2023 8.2 (A)     Bicarbonate (mmol/L)   Date Value   10/17/2023 26   10/16/2023 28   02/16/2023 30     Urea Nitrogen (mg/dL) "   Date Value   10/17/2023 31 (H)   10/16/2023 36 (H)   02/16/2023 23     Creatinine (mg/dL)   Date Value   10/17/2023 1.37 (H)   10/16/2023 1.41 (H)   02/16/2023 1.25     Lab Results   Component Value Date    CHOL 111 10/16/2023    CHOL 89 02/16/2023     Lab Results   Component Value Date    HDL 59.5 10/16/2023    HDL 36.1 (A) 02/16/2023     Lab Results   Component Value Date    LDLCALC 39 10/16/2023     Lab Results   Component Value Date    TRIG 61 10/16/2023    TRIG 75 02/16/2023       IMPRESSION  TYPE 2 DIABETES MELLITUS   LONG TERM CURRENT INSULIN USE  Excellent overall glucose control  Recent nocturnal hypoglycemia  Weight gain 30 lbs on insulin    RECOMMENDATIONS  Draw A1c   Results available on [employer]  Call/message if you have not received results in 3 days.     Resume metformin 850 mg twice daily    Decrease Humalog Mix 75/25 to 35 units twice daily    Follow up 3-4 months  Repeat A1c before next appointment if not already done.

## 2024-01-18 NOTE — PROGRESS NOTES
"History Of Present Illness  Sean Khanna is a 78 y.o. male     Duration of type 2 diabetes mellitus:  6 years  Complications:  Coronary artery disease    Humalog Mix 75/25  Breakfast 42 units  Dinner 42 units     Jardiance 25 mg/day  Metformin 850 mg--he stopped evening dose 1 month ago due to nocturnal hypoglycemia    FreeStyle Neyda 2  Patient is testing glucose 288 times daily  Records reviewed, on file    Last eye exam:      Past Medical History  He has no past medical history on file.    Surgical History  He has a past surgical history that includes Other surgical history (11/18/2022).     Social History  He reports that he has never smoked. He has never used smokeless tobacco. He reports that he does not drink alcohol and does not use drugs.    Family History  No family history on file.    Medications  Current Outpatient Medications   Medication Instructions    amLODIPine (NORVASC) 5 mg, oral, Daily    Eliquis 5 mg, oral, 2 times daily    HumaLOG Mix 75-25 KwikPen 100 unit/mL (75-25) injection INJECT 42 UNIT TWICE DAILY    Jardiance 25 mg 1 tablet, oral, Daily    lisinopriL-hydrochlorothiazide 20-25 mg tablet 1 tablet, oral, Daily    metFORMIN (GLUCOPHAGE) 850 mg, oral, 2 times daily    metoprolol tartrate (LOPRESSOR) 12.5 mg, oral, 2 times daily    OneTouch Delica Plus Lancet 30 gauge misc FOR GLUCOSE TESTING 3 TIMES DAILY    OneTouch Verio Flex meter misc FOR GLUCOSE TESTING 3 TIMES DAILY    OneTouch Verio test strips strip TEST 3 TIMES DAILY.    pen needle, diabetic (BD Gina 2nd Gen Pen Needle) 32 gauge x 5/32\" needle USE AS DIRECTED    simvastatin (ZOCOR) 40 mg, oral, Nightly       Allergies  Patient has no known allergies.    Review of Systems   Constitutional:  Positive for unexpected weight change (gain 30 lbs).   Eyes:  Negative for visual disturbance.   Respiratory:  Negative for shortness of breath.    Cardiovascular:  Negative for chest pain.   Gastrointestinal:  Negative for constipation, " "diarrhea, nausea and vomiting.   Endocrine:        As above   Genitourinary:  Negative for difficulty urinating and frequency.   Musculoskeletal:  Positive for arthralgias (shoulder).   Neurological:  Negative for headaches.         Last Recorded Vitals  Blood pressure 159/77, pulse 57, resp. rate 20, height 1.854 m (6' 1\"), weight 110 kg (241 lb 6.5 oz).    Physical Exam  Constitutional:       General: He is not in acute distress.  HENT:      Head: Normocephalic.      Mouth/Throat:      Mouth: Mucous membranes are moist.   Eyes:      Extraocular Movements: Extraocular movements intact.   Neck:      Thyroid: No thyromegaly.   Cardiovascular:      Pulses:           Radial pulses are 2+ on the right side and 2+ on the left side.   Musculoskeletal:      Right lower leg: No edema.      Left lower leg: No edema.   Lymphadenopathy:      Cervical: No cervical adenopathy.   Neurological:      Mental Status: He is alert.      Motor: No tremor.   Psychiatric:         Mood and Affect: Affect normal.          Relevant Results  Glucose (mg/dL)   Date Value   10/17/2023 135 (H)   10/16/2023 90   02/16/2023 82     Hemoglobin A1C (%)   Date Value   10/16/2023 7.4 (H)   07/10/2023 8.5 (A)   02/16/2023 8.2 (A)     Bicarbonate (mmol/L)   Date Value   10/17/2023 26   10/16/2023 28   02/16/2023 30     Urea Nitrogen (mg/dL)   Date Value   10/17/2023 31 (H)   10/16/2023 36 (H)   02/16/2023 23     Creatinine (mg/dL)   Date Value   10/17/2023 1.37 (H)   10/16/2023 1.41 (H)   02/16/2023 1.25     Lab Results   Component Value Date    CHOL 111 10/16/2023    CHOL 89 02/16/2023     Lab Results   Component Value Date    HDL 59.5 10/16/2023    HDL 36.1 (A) 02/16/2023     Lab Results   Component Value Date    LDLCALC 39 10/16/2023     Lab Results   Component Value Date    TRIG 61 10/16/2023    TRIG 75 02/16/2023       IMPRESSION  TYPE 2 DIABETES MELLITUS   LONG TERM CURRENT INSULIN USE  Excellent overall glucose control  Recent nocturnal " hypoglycemia  Weight gain 30 lbs on insulin    RECOMMENDATIONS  Draw A1c   Results available on [facility]  Call/message if you have not received results in 3 days.     Resume metformin 850 mg twice daily    Decrease Humalog Mix 75/25 to 35 units twice daily    Follow up 3-4 months  Repeat A1c before next appointment if not already done.

## 2024-01-18 NOTE — PROGRESS NOTES
Subjective   HPI: Sean Khanna is a 78 y.o. male is here for evaluation and treatment of a painful bump on my left shoulder.    ROS: No other skin or systemic complaints other than what is documented elsewhere in the note.    ALLERGIES: Patient has no known allergies.    SOCIAL:  reports that he has never smoked. He has never used smokeless tobacco. He reports that he does not drink alcohol and does not use drugs.    Objective     Description: Patient has an inflamed purulent cystic lesion left anterior shoulder.    Assessment/Plan   1. Epidermal cyst  Neck - Anterior    Tissue/Wound Culture/Smear - Neck - Anterior    Related Medications  triamcinolone acetonide (Kenalog) injection 10 mg      2. Seborrheic dermatitis  Scalp       Plan: Incision and drainage followed with intralesional Kenalog.  Patient will also be started on doxycycline 100 mg p.o. daily PC for 7 days.  This should be doxycycline hyclate.    FOLLOW UP: 7 days.    The patient was encouraged to contact me with any further questions or concerns.  Edmund Vail MD  1/18/2024

## 2024-01-18 NOTE — PROGRESS NOTES
Subjective     Sean Khanna is a 78 y.o. male who presents for the following: Suspicious Skin Lesion (Bump on Left Anterior Neck).     Review of Systems:  No other skin or systemic complaints other than what is documented elsewhere in the note.    The following portions of the chart were reviewed this encounter and updated as appropriate:          Skin Cancer History  No skin cancer on file.      Specialty Problems    None       Objective   Well appearing patient in no apparent distress; mood and affect are within normal limits.    A focused skin examination was performed. All findings within normal limits unless otherwise noted below.    Assessment/Plan   1. Epidermal cyst  Neck - Anterior    Tissue/Wound Culture/Smear - Neck - Anterior    2. Seborrheic dermatitis  Scalp

## 2024-01-18 NOTE — PATIENT INSTRUCTIONS
RECOMMENDATIONS  Draw A1c   Results available on [facility]  Call/message if you have not received results in 3 days.     Resume metformin 850 mg twice daily    Decrease Humalog Mix 75/25 to 35 units twice daily    Follow up 3-4 months  Repeat A1c before next appointment if not already done.

## 2024-01-19 LAB
EST. AVERAGE GLUCOSE BLD GHB EST-MCNC: 154 MG/DL
HBA1C MFR BLD: 7 %

## 2024-01-21 LAB
BACTERIA SPEC CULT: NORMAL
GRAM STN SPEC: NORMAL
GRAM STN SPEC: NORMAL

## 2024-01-30 ENCOUNTER — APPOINTMENT (OUTPATIENT)
Dept: PRIMARY CARE | Facility: CLINIC | Age: 79
End: 2024-01-30
Payer: COMMERCIAL

## 2024-02-13 ENCOUNTER — OFFICE VISIT (OUTPATIENT)
Dept: PRIMARY CARE | Facility: CLINIC | Age: 79
End: 2024-02-13
Payer: MEDICARE

## 2024-02-13 VITALS
WEIGHT: 233 LBS | HEIGHT: 71 IN | BODY MASS INDEX: 32.62 KG/M2 | DIASTOLIC BLOOD PRESSURE: 68 MMHG | SYSTOLIC BLOOD PRESSURE: 118 MMHG | HEART RATE: 58 BPM

## 2024-02-13 DIAGNOSIS — Z00.00 ENCOUNTER FOR MEDICARE ANNUAL WELLNESS EXAM: Primary | ICD-10-CM

## 2024-02-13 DIAGNOSIS — E11.49 WELL CONTROLLED TYPE 2 DIABETES MELLITUS WITH NEUROLOGICAL MANIFESTATIONS (MULTI): ICD-10-CM

## 2024-02-13 DIAGNOSIS — Z79.4 TYPE 2 DIABETES MELLITUS WITHOUT COMPLICATION, WITH LONG-TERM CURRENT USE OF INSULIN (MULTI): ICD-10-CM

## 2024-02-13 DIAGNOSIS — R09.82 POST-NASAL DRIP: ICD-10-CM

## 2024-02-13 DIAGNOSIS — E11.9 TYPE 2 DIABETES MELLITUS WITHOUT COMPLICATION, WITH LONG-TERM CURRENT USE OF INSULIN (MULTI): ICD-10-CM

## 2024-02-13 DIAGNOSIS — G89.29 CHRONIC RIGHT SHOULDER PAIN: ICD-10-CM

## 2024-02-13 DIAGNOSIS — I48.91 UNSPECIFIED ATRIAL FIBRILLATION (MULTI): ICD-10-CM

## 2024-02-13 DIAGNOSIS — N18.30 STAGE 3 CHRONIC KIDNEY DISEASE, UNSPECIFIED WHETHER STAGE 3A OR 3B CKD (MULTI): ICD-10-CM

## 2024-02-13 DIAGNOSIS — M25.511 CHRONIC RIGHT SHOULDER PAIN: ICD-10-CM

## 2024-02-13 DIAGNOSIS — I10 ESSENTIAL (PRIMARY) HYPERTENSION: ICD-10-CM

## 2024-02-13 DIAGNOSIS — Z00.00 HEALTHCARE MAINTENANCE: ICD-10-CM

## 2024-02-13 DIAGNOSIS — Z71.89 ADVANCED CARE PLANNING/COUNSELING DISCUSSION: ICD-10-CM

## 2024-02-13 DIAGNOSIS — I48.20 CHRONIC ATRIAL FIBRILLATION (MULTI): ICD-10-CM

## 2024-02-13 DIAGNOSIS — Z12.5 PROSTATE CANCER SCREENING: ICD-10-CM

## 2024-02-13 DIAGNOSIS — I10 PRIMARY HYPERTENSION: ICD-10-CM

## 2024-02-13 DIAGNOSIS — E78.2 MIXED HYPERLIPIDEMIA: ICD-10-CM

## 2024-02-13 PROCEDURE — 3074F SYST BP LT 130 MM HG: CPT | Performed by: STUDENT IN AN ORGANIZED HEALTH CARE EDUCATION/TRAINING PROGRAM

## 2024-02-13 PROCEDURE — 1159F MED LIST DOCD IN RCRD: CPT | Performed by: STUDENT IN AN ORGANIZED HEALTH CARE EDUCATION/TRAINING PROGRAM

## 2024-02-13 PROCEDURE — 99497 ADVNCD CARE PLAN 30 MIN: CPT | Performed by: STUDENT IN AN ORGANIZED HEALTH CARE EDUCATION/TRAINING PROGRAM

## 2024-02-13 PROCEDURE — 1160F RVW MEDS BY RX/DR IN RCRD: CPT | Performed by: STUDENT IN AN ORGANIZED HEALTH CARE EDUCATION/TRAINING PROGRAM

## 2024-02-13 PROCEDURE — 1126F AMNT PAIN NOTED NONE PRSNT: CPT | Performed by: STUDENT IN AN ORGANIZED HEALTH CARE EDUCATION/TRAINING PROGRAM

## 2024-02-13 PROCEDURE — G0439 PPPS, SUBSEQ VISIT: HCPCS | Performed by: STUDENT IN AN ORGANIZED HEALTH CARE EDUCATION/TRAINING PROGRAM

## 2024-02-13 PROCEDURE — 99214 OFFICE O/P EST MOD 30 MIN: CPT | Performed by: STUDENT IN AN ORGANIZED HEALTH CARE EDUCATION/TRAINING PROGRAM

## 2024-02-13 PROCEDURE — G0444 DEPRESSION SCREEN ANNUAL: HCPCS | Performed by: STUDENT IN AN ORGANIZED HEALTH CARE EDUCATION/TRAINING PROGRAM

## 2024-02-13 PROCEDURE — 1170F FXNL STATUS ASSESSED: CPT | Performed by: STUDENT IN AN ORGANIZED HEALTH CARE EDUCATION/TRAINING PROGRAM

## 2024-02-13 PROCEDURE — G0446 INTENS BEHAVE THER CARDIO DX: HCPCS | Performed by: STUDENT IN AN ORGANIZED HEALTH CARE EDUCATION/TRAINING PROGRAM

## 2024-02-13 PROCEDURE — 99397 PER PM REEVAL EST PAT 65+ YR: CPT | Performed by: STUDENT IN AN ORGANIZED HEALTH CARE EDUCATION/TRAINING PROGRAM

## 2024-02-13 PROCEDURE — 1036F TOBACCO NON-USER: CPT | Performed by: STUDENT IN AN ORGANIZED HEALTH CARE EDUCATION/TRAINING PROGRAM

## 2024-02-13 PROCEDURE — 3078F DIAST BP <80 MM HG: CPT | Performed by: STUDENT IN AN ORGANIZED HEALTH CARE EDUCATION/TRAINING PROGRAM

## 2024-02-13 RX ORDER — SIMVASTATIN 40 MG/1
40 TABLET, FILM COATED ORAL NIGHTLY
Qty: 90 TABLET | Refills: 1 | Status: SHIPPED | OUTPATIENT
Start: 2024-02-13

## 2024-02-13 RX ORDER — LISINOPRIL AND HYDROCHLOROTHIAZIDE 20; 25 MG/1; MG/1
1 TABLET ORAL DAILY
Qty: 90 TABLET | Refills: 1 | Status: SHIPPED | OUTPATIENT
Start: 2024-02-13

## 2024-02-13 RX ORDER — AMLODIPINE BESYLATE 5 MG/1
5 TABLET ORAL DAILY
Qty: 90 TABLET | Refills: 1 | Status: SHIPPED | OUTPATIENT
Start: 2024-02-13 | End: 2024-05-08 | Stop reason: SINTOL

## 2024-02-13 RX ORDER — METOPROLOL TARTRATE 25 MG/1
12.5 TABLET, FILM COATED ORAL 2 TIMES DAILY
Qty: 90 TABLET | Refills: 1 | Status: SHIPPED | OUTPATIENT
Start: 2024-02-13 | End: 2024-03-05 | Stop reason: SINTOL

## 2024-02-13 ASSESSMENT — ACTIVITIES OF DAILY LIVING (ADL)
DOING_HOUSEWORK: INDEPENDENT
DRESSING: INDEPENDENT
TAKING_MEDICATION: INDEPENDENT
BATHING: INDEPENDENT
GROCERY_SHOPPING: INDEPENDENT
MANAGING_FINANCES: INDEPENDENT

## 2024-02-13 ASSESSMENT — ENCOUNTER SYMPTOMS
LOSS OF SENSATION IN FEET: 0
DEPRESSION: 0
OCCASIONAL FEELINGS OF UNSTEADINESS: 0

## 2024-02-13 ASSESSMENT — PATIENT HEALTH QUESTIONNAIRE - PHQ9
2. FEELING DOWN, DEPRESSED OR HOPELESS: NOT AT ALL
1. LITTLE INTEREST OR PLEASURE IN DOING THINGS: NOT AT ALL
SUM OF ALL RESPONSES TO PHQ9 QUESTIONS 1 AND 2: 0

## 2024-02-13 NOTE — PROGRESS NOTES
Subjective   Reason for Visit: Sean Khanna is an 78 y.o. male here for a Medicare Wellness visit.          Reviewed all medications by prescribing practitioner or clinical pharmacist (such as prescriptions, OTCs, herbal therapies and supplements) and documented in the medical record.    HPI  1. Medicare Wellness Exam/Health Maintenance  Overall patient is doing well and has no concerns for today's visit   Immunization: Tdap- up to date Influenza vaccine- up to date  Shingrix- 2/8/24; PCV13 - up to date PPSV23- up to date   RSV- 10/16/23  COVID-19 vaccine: x 5 doses  Colon Cancer Screening: Colonoscopy done 2-3 years ago, no polyps found. He declines future colonoscopies  Diet: well balanced  Exercise: working on it   Tobacco: former smoker, quit 30 years ago   EtOH: few times a month a month socially  He is not fasting today in order to complete lab work   Denies any nausea, vomiting, shortness of breath, heart palpitations, diarrhea, constipation or any other acute symptoms.    2. Diabetes Mellitus Type II   Takes Jardiance 25 mg, Metformin 850 twice a day, and humalog   Monitors blood sugars at home, which are usually ~126  Follows with Dr. Novak in endocrinology to manage his diabetes  Denies polyuria, polydipsia, numbness or tingling      3. Atrial Fibrillation  Takes metoprolol 12.5 mg twice a day   Previously followed with Dr. Ivory (cardiology) who has since retired; last seen 2/9/23  Appointment scheduled in a few weeks to establish care with Dr. Russell  Denies chest pain, chest pressure, heart palpitations or any other acute cardiopulmonary symptoms      4. Hyperlipidemia   Takes Simvastatin 40 mg   Denies chest pain, chest pressure, shortness of breath.  He did experience right leg cramping for 3-4 days about 2 weeks ago, which resolved with flexeril. No episodes since  Willing and wishing get lab work performed     5. Hypertension   Takes Metoprolol 12.5 mg twice a day, amlodipine 5 mg, and  "Lisinopril-HCTZ 20-25 mg   Blood pressure is stable in office at 118/68  Does not typically take blood pressure at home  Denies chest pain, lightheadedness, dizziness or any other acute cardiopulmonary symptoms      6.  Right shoulder OA  Has a longstanding history of right-sided shoulder pain, which has since completely resolved since reducing his exercise  Imaging done on 10/16/23 showed mild osteoarthritis of the right AC joint   He is not interested in taking medication or receiving steroid shot  Currently is slowly beginning to exercise again and will monitor if pain starts up again    7.  Post-nasal drip/Throat congestion  Patient states that since having COVID-19 about one year ago, he experiences intermittent coughing fits in which he coughs up mucous  Fits are always brought on by sneezing, after which he begins to \"violently cough\"   Does not know what color mucous is  Denies chest pain, shortness of breath, wheezing, nasal congestion, nausea, vomiting, or any blood in mucous  These fits occur a few times each month, typically at night while patient is sitting down watching TV  He does not believe they are related to GERD, as he does not feel throat burning and usually has not eaten recently when they occur  Also notes occasional post-nasal drip feeling in the back of his throat    No Known Allergies    Immunization History   Administered Date(s) Administered    Influenza, Seasonal, Quadrivalent, Adjuvanted 10/02/2023    Influenza, seasonal, injectable 10/02/2023    Moderna COVID-19 vaccine, bivalent, blue cap/gray label *Check age/dose* 11/10/2022    Moderna SARS-CoV-2 Vaccination 02/22/2021, 03/22/2021, 11/16/2021    Pfizer COVID-19 vaccine, Fall 2023, 12 years and older, (30mcg/0.3mL) 10/05/2023    Pfizer Purple Cap SARS-CoV-2 10/05/2023    Pneumococcal conjugate vaccine, 13-valent (PREVNAR 13) 10/02/2018    Pneumococcal polysaccharide vaccine, 23-valent, age 2 years and older (PNEUMOVAX 23) 12/10/2019 " "   RSV, 60 Years And Older (AREXVY) 10/16/2023    Zoster vaccine, recombinant, adult (SHINGRIX) 02/08/2024       Patient Self Assessment of Health Status  Patient Self Assessment: Good    Nutrition and Exercise  Current Diet: Well Balanced Diet  Adequate Fluid Intake: No  Caffeine: Yes  Exercise Frequency: Infrequently    Functional Ability/Level of Safety  Cognitive Impairment Observed: No cognitive impairment observed    Home Safety Risk Factors: None    Patient Care Team:  Ante T Grupo DO as PCP - General  Ante T Plekavin DO as PCP - [employer] PCP     Review of Systems  All pertinent positive symptoms are included in the history of present illness.    All other systems have been reviewed and are negative and noncontributory to this patient's current ailments.    Objective   Vitals:  /68 (BP Location: Left arm, Patient Position: Sitting, BP Cuff Size: Adult)   Pulse 58   Ht 1.803 m (5' 11\")   Wt 106 kg (233 lb)   BMI 32.50 kg/m²     Office Visit on 01/18/2024   Component Date Value Ref Range Status    Tissue/Wound Culture/Smear 01/18/2024 (1+) Rare Mixed Skin Microorganisms   Final    Gram Stain 01/18/2024 (1+) Rare Polymorphonuclear leukocytes   Final    Gram Stain 01/18/2024 No organisms seen   Final   Lab on 01/18/2024   Component Date Value Ref Range Status    Hemoglobin A1C 01/18/2024 7.0 (H)  see below % Final    Estimated Average Glucose 01/18/2024 154  Not Established mg/dL Final   Office Visit on 11/06/2023   Component Date Value Ref Range Status    Case Report 11/06/2023    Final                    Value:Dermatopathology                                  Case: [id number]                                   Authorizing Provider:  Edmund Vail MD      Collected:           11/06/2023 1624              Ordering Location:     [facility]     Received:            11/06/2023 1651              Pathologist:           Kianna Reid MD                                  "                            Specimens:   A) - SKIN PUNCH BIOPSY, Right Upper Cutaneous Lip                                                   B) - SKIN SHAVE EXCISION, Left Posterior Shoulder                                          FINAL DIAGNOSIS 11/06/2023    Final                    Value:This result contains rich text formatting which cannot be displayed here.      11/06/2023    Final                    Value:This result contains rich text formatting which cannot be displayed here.    Clinical History 11/06/2023    Final                    Value:This result contains rich text formatting which cannot be displayed here.    Microscopic Description 11/06/2023    Final                    Value:This result contains rich text formatting which cannot be displayed here.    Gross Description 11/06/2023    Final                    Value:This result contains rich text formatting which cannot be displayed here.   Lab on 10/17/2023   Component Date Value Ref Range Status    Glucose 10/17/2023 135 (H)  74 - 99 mg/dL Final    Sodium 10/17/2023 137  136 - 145 mmol/L Final    Potassium 10/17/2023 4.5  3.5 - 5.3 mmol/L Final    Chloride 10/17/2023 100  98 - 107 mmol/L Final    Bicarbonate 10/17/2023 26  21 - 32 mmol/L Final    Anion Gap 10/17/2023 16  10 - 20 mmol/L Final    Urea Nitrogen 10/17/2023 31 (H)  6 - 23 mg/dL Final    Creatinine 10/17/2023 1.37 (H)  0.50 - 1.30 mg/dL Final    eGFR 10/17/2023 53 (L)  >60 mL/min/1.73m*2 Final    Calculations of estimated GFR are performed using the 2021 CKD-EPI Study Refit equation without the race variable for the IDMS-Traceable creatinine methods.  https://jasn.asnjournals.org/content/early/2021/09/22/ASN.6606005001    Calcium 10/17/2023 9.4  8.6 - 10.3 mg/dL Final    Albumin 10/17/2023 4.2  3.4 - 5.0 g/dL Final    Alkaline Phosphatase 10/17/2023 83  33 - 136 U/L Final    Total Protein 10/17/2023 7.6  6.4 - 8.2 g/dL Final    AST 10/17/2023 14  9 - 39 U/L Final    Bilirubin, Total  10/17/2023 0.8  0.0 - 1.2 mg/dL Final    ALT 10/17/2023 16  10 - 52 U/L Final    Patients treated with Sulfasalazine may generate falsely decreased results for ALT.   Lab on 10/16/2023   Component Date Value Ref Range Status    Cholesterol 10/16/2023 111  mg/dL Final    HDL-Cholesterol 10/16/2023 59.5  mg/dL Final    Cholesterol/HDL Ratio 10/16/2023 1.9   Final    LDL Calculated 10/16/2023 39  mg/dL Final    VLDL 10/16/2023 12  0 - 40 mg/dL Final    Triglycerides 10/16/2023 61  mg/dL Final    Non HDL Cholesterol 10/16/2023 52  0 - 149 mg/dL Final    Glucose 10/16/2023 90  74 - 99 mg/dL Final    Sodium 10/16/2023 140  136 - 145 mmol/L Final    Potassium 10/16/2023 5.4 (H)  3.5 - 5.3 mmol/L Final    Chloride 10/16/2023 103  98 - 107 mmol/L Final    Bicarbonate 10/16/2023 28  21 - 32 mmol/L Final    Anion Gap 10/16/2023 14  10 - 20 mmol/L Final    Urea Nitrogen 10/16/2023 36 (H)  6 - 23 mg/dL Final    Creatinine 10/16/2023 1.41 (H)  0.50 - 1.30 mg/dL Final    eGFR 10/16/2023 52 (L)  >60 mL/min/1.73m*2 Final    Calculations of estimated GFR are performed using the 2021 CKD-EPI Study Refit equation without the race variable for the IDMS-Traceable creatinine methods.  https://jasn.asnjournals.org/content/early/2021/09/22/ASN.9673118680    Calcium 10/16/2023 9.8  8.6 - 10.3 mg/dL Final    Albumin 10/16/2023 4.3  3.4 - 5.0 g/dL Final    Alkaline Phosphatase 10/16/2023 82  33 - 136 U/L Final    Total Protein 10/16/2023 7.4  6.4 - 8.2 g/dL Final    AST 10/16/2023 16  9 - 39 U/L Final    Bilirubin, Total 10/16/2023 0.6  0.0 - 1.2 mg/dL Final    ALT 10/16/2023 19  10 - 52 U/L Final    Patients treated with Sulfasalazine may generate falsely decreased results for ALT.    Hemoglobin A1C 10/16/2023 7.4 (H)  see below % Final    Estimated Average Glucose 10/16/2023 166  Not Established mg/dL Final       Physical Exam    CONSTITUTIONAL - well nourished, well developed, looks like stated age, in no acute distress, not ill-appearing,  and not tired appearing  SKIN - normal skin color and pigmentation  HEAD - no trauma, normocephalic  ENT - TM's intact, no injection, no signs of infection, uvula midline, normal tongue movement and throat normal, no exudate, nasal passage without discharge and patent  EYES - pupils are equal and reactive to light  NECK - supple without rigidity  CHEST - clear to auscultation, no wheezing, no crackles and no rales, good effort  CARDIAC - regular rate and regular rhythm, no skipped beats, no murmur  ABDOMEN - no organomegaly, soft, nontender, nondistended  EXTREMITIES - no edema, no deformities  NEUROLOGICAL - normal gait, normal balance, normal motor  PSYCHIATRIC - alert, pleasant and cordial, age-appropriate  IMMUNOLOGIC - no cervical lymphadenopathy    Assessment/Plan   1. Medicare Wellness Exam/ Health Maintenance  Complete history and physical examination was performed alongside and MCR visit as noted above  I recommend eating a well balanced diet and exercising regularly  Please get blood work done after today's appointment  We will contact you if medication changes must be made, depending on lab results    Advance care directives  We had a long conversation about advance care directives as well as discussing power of  and living will  I understand that you may have a living well and I do strongly recommend you looking into a power of   If/when you get these done/completed, please bring these into the office so we can scan them into your chart    2. Diabetes Mellitus Type II/CKD  Continue Jardiance 25 mg, Metformin 850 twice a day, and humalog  Continue to follow up with endocrinologist  Most recent hemoglobin A1c was at 7.0% which is a great improvement, keep up the great work  GFR at your last visit was at 53%  We will continue monitoring closely so please stay hydrated at this time     3. Atrial Fibrillation  Continue Metoprolol 12.5 mg twice a day   Follow with Dr. Russell   I would like to  have you monitor and record blood pressures at home   Blood pressure goal should be below 130/80, ideally 120/80  Given your history of A-Fib, please go to the ED immediately if you begin to experience chest pain, palpitations, excessive sweating, difficulty breathing, shortness of breath, facial numbness/weakness, vision disturbances, significant arm or leg numbness/weakness, or severe headache      4. Hyperlipidemia   Continue Simvastatin 40 mg   I have provided you with a requisition for a lipid panel   I will notify you of the results and make treatment recommendations accordingly      5. Hypertension   Continue Metoprolol 12.5 mg twice a day, amlodipine 5 mg and Lisinopril-HCTZ 20-25 mg   I would like to have you monitor and record blood pressures at home   Blood pressure goal should be below 130/80, ideally 120/80     6.  Right shoulder pain  Continue exercising as tolerated  X-ray did show some slight arthritis  I continue to recommend that you continue monitoring if this worsens we will discuss a cortisone injection and/or a physical therapy referral    7. Post-nasal drip/Throat Congestion  We discussed your symptoms, which sound to me like they may be related to allergies  I would like you to try Flonase nasal spray and see if symptoms resolve  If you do not feel better, we can discuss a trial of omeprazole and/or referral to ENT      Please follow up with the office for any further questions or concerns  Otherwise, please follow-up in 6 months for continued care

## 2024-02-21 ENCOUNTER — APPOINTMENT (OUTPATIENT)
Dept: CARDIOLOGY | Facility: CLINIC | Age: 79
End: 2024-02-21
Payer: MEDICARE

## 2024-02-21 ENCOUNTER — LAB (OUTPATIENT)
Dept: LAB | Facility: LAB | Age: 79
End: 2024-02-21
Payer: MEDICARE

## 2024-02-21 DIAGNOSIS — Z00.00 HEALTHCARE MAINTENANCE: ICD-10-CM

## 2024-02-21 DIAGNOSIS — Z00.00 ENCOUNTER FOR MEDICARE ANNUAL WELLNESS EXAM: ICD-10-CM

## 2024-02-21 DIAGNOSIS — I10 PRIMARY HYPERTENSION: ICD-10-CM

## 2024-02-21 DIAGNOSIS — M25.511 CHRONIC RIGHT SHOULDER PAIN: ICD-10-CM

## 2024-02-21 DIAGNOSIS — Z79.4 TYPE 2 DIABETES MELLITUS WITHOUT COMPLICATION, WITH LONG-TERM CURRENT USE OF INSULIN (MULTI): ICD-10-CM

## 2024-02-21 DIAGNOSIS — I48.91 UNSPECIFIED ATRIAL FIBRILLATION (MULTI): ICD-10-CM

## 2024-02-21 DIAGNOSIS — E78.2 MIXED HYPERLIPIDEMIA: ICD-10-CM

## 2024-02-21 DIAGNOSIS — G89.29 CHRONIC RIGHT SHOULDER PAIN: ICD-10-CM

## 2024-02-21 DIAGNOSIS — I10 ESSENTIAL (PRIMARY) HYPERTENSION: ICD-10-CM

## 2024-02-21 DIAGNOSIS — Z12.5 PROSTATE CANCER SCREENING: ICD-10-CM

## 2024-02-21 DIAGNOSIS — I48.20 CHRONIC ATRIAL FIBRILLATION (MULTI): ICD-10-CM

## 2024-02-21 DIAGNOSIS — E11.9 TYPE 2 DIABETES MELLITUS WITHOUT COMPLICATION, WITH LONG-TERM CURRENT USE OF INSULIN (MULTI): ICD-10-CM

## 2024-02-21 LAB
ALBUMIN SERPL BCP-MCNC: 3.8 G/DL (ref 3.4–5)
ALP SERPL-CCNC: 83 U/L (ref 33–136)
ALT SERPL W P-5'-P-CCNC: 16 U/L (ref 10–52)
ANION GAP SERPL CALC-SCNC: 12 MMOL/L (ref 10–20)
AST SERPL W P-5'-P-CCNC: 14 U/L (ref 9–39)
BILIRUB SERPL-MCNC: 0.7 MG/DL (ref 0–1.2)
BUN SERPL-MCNC: 28 MG/DL (ref 6–23)
CALCIUM SERPL-MCNC: 9.2 MG/DL (ref 8.6–10.3)
CHLORIDE SERPL-SCNC: 103 MMOL/L (ref 98–107)
CHOLEST SERPL-MCNC: 104 MG/DL (ref 0–199)
CHOLESTEROL/HDL RATIO: 2.2
CO2 SERPL-SCNC: 29 MMOL/L (ref 21–32)
CREAT SERPL-MCNC: 1.28 MG/DL (ref 0.5–1.3)
EGFRCR SERPLBLD CKD-EPI 2021: 58 ML/MIN/1.73M*2
GLUCOSE SERPL-MCNC: 91 MG/DL (ref 74–99)
HDLC SERPL-MCNC: 48 MG/DL
LDLC SERPL CALC-MCNC: 43 MG/DL
NON HDL CHOLESTEROL: 56 MG/DL (ref 0–149)
POTASSIUM SERPL-SCNC: 4.6 MMOL/L (ref 3.5–5.3)
PROT SERPL-MCNC: 7.6 G/DL (ref 6.4–8.2)
PSA SERPL-MCNC: 2.94 NG/ML
SODIUM SERPL-SCNC: 139 MMOL/L (ref 136–145)
TRIGL SERPL-MCNC: 67 MG/DL (ref 0–149)
VLDL: 13 MG/DL (ref 0–40)

## 2024-02-21 PROCEDURE — 36415 COLL VENOUS BLD VENIPUNCTURE: CPT

## 2024-02-21 PROCEDURE — 80053 COMPREHEN METABOLIC PANEL: CPT

## 2024-02-21 PROCEDURE — 80061 LIPID PANEL: CPT

## 2024-02-21 PROCEDURE — G0103 PSA SCREENING: HCPCS

## 2024-02-22 NOTE — RESULT ENCOUNTER NOTE
Sugar, liver, electrolytes are all within normal limits    Kidney function still slightly low at 58% but overall this is stable    Prostate did increase in value from 1.38 up to 2.94  I am not fully concerned but we will most likely repeat this value within the next 6 months    Cholesterol looks great at 104, HDL 48, LDL 43, triglycerides 67

## 2024-03-05 ENCOUNTER — OFFICE VISIT (OUTPATIENT)
Dept: CARDIOLOGY | Facility: CLINIC | Age: 79
End: 2024-03-05
Payer: MEDICARE

## 2024-03-05 VITALS
HEART RATE: 57 BPM | WEIGHT: 241.4 LBS | SYSTOLIC BLOOD PRESSURE: 124 MMHG | BODY MASS INDEX: 31.99 KG/M2 | HEIGHT: 73 IN | DIASTOLIC BLOOD PRESSURE: 61 MMHG

## 2024-03-05 DIAGNOSIS — I25.10 CORONARY ARTERY DISEASE INVOLVING NATIVE CORONARY ARTERY OF NATIVE HEART WITHOUT ANGINA PECTORIS: ICD-10-CM

## 2024-03-05 DIAGNOSIS — I48.11 LONGSTANDING PERSISTENT ATRIAL FIBRILLATION (MULTI): ICD-10-CM

## 2024-03-05 DIAGNOSIS — E78.00 PURE HYPERCHOLESTEROLEMIA: Primary | ICD-10-CM

## 2024-03-05 DIAGNOSIS — I10 PRIMARY HYPERTENSION: ICD-10-CM

## 2024-03-05 PROBLEM — I48.91 AF (ATRIAL FIBRILLATION) (MULTI): Status: RESOLVED | Noted: 2023-07-21 | Resolved: 2024-03-05

## 2024-03-05 PROBLEM — I48.0 PAF (PAROXYSMAL ATRIAL FIBRILLATION) (MULTI): Status: ACTIVE | Noted: 2024-03-05

## 2024-03-05 PROBLEM — I48.0 PAF (PAROXYSMAL ATRIAL FIBRILLATION) (MULTI): Status: RESOLVED | Noted: 2024-03-05 | Resolved: 2024-03-05

## 2024-03-05 PROCEDURE — 1160F RVW MEDS BY RX/DR IN RCRD: CPT | Performed by: INTERNAL MEDICINE

## 2024-03-05 PROCEDURE — 1159F MED LIST DOCD IN RCRD: CPT | Performed by: INTERNAL MEDICINE

## 2024-03-05 PROCEDURE — 99204 OFFICE O/P NEW MOD 45 MIN: CPT | Performed by: INTERNAL MEDICINE

## 2024-03-05 PROCEDURE — 3074F SYST BP LT 130 MM HG: CPT | Performed by: INTERNAL MEDICINE

## 2024-03-05 PROCEDURE — 1036F TOBACCO NON-USER: CPT | Performed by: INTERNAL MEDICINE

## 2024-03-05 PROCEDURE — 99214 OFFICE O/P EST MOD 30 MIN: CPT | Performed by: INTERNAL MEDICINE

## 2024-03-05 PROCEDURE — 3078F DIAST BP <80 MM HG: CPT | Performed by: INTERNAL MEDICINE

## 2024-03-05 PROCEDURE — 1126F AMNT PAIN NOTED NONE PRSNT: CPT | Performed by: INTERNAL MEDICINE

## 2024-03-05 NOTE — PROGRESS NOTES
Chief Complaint:   Atrial Fibrillation     History of Present Illness     Sean Khanna is a 78 y.o. male presenting with coronary artery disease.  He had a history of angina and underwent PCI.  The patient is tolerating guideline-directed medical therapy with antiplatelet and statin medication and is compliant.  The patient exercises regularly and follows a heart healthy diet.  The patient has been well since their last office appointment and is not having any anginal symptoms or dyspnea on exertion.      Here for routine follow-up of long-standing (>12 months) persistent atrial fibrillation.  The patient has been asymptomatic.  The patient has been rate-controlled in atrial fibrillation on medical treatment which they are tolerating well and are compliant.  The current treatment strategy is rate control.  The CHADS2-VASC2 score is 4  and the ACC/AHA guidelines recommend anticoagulation for stroke prophylaxis.  The patient is being treated with Eliquis and has tolerated treatment with no bleeding and is compliant.      Review of Systems  All pertinent systems have been reviewed and are negative except for what is stated in the history of present illness.    All other systems have been reviewed and are negative and noncontributory to this patient's current ailments.      Review of Systems  All pertinent systems have been reviewed and are negative except for what is stated in the history of present illness.    All other systems have been reviewed and are negative and noncontributory to this patient's current ailments.  .       Previous History     Past Medical History:  He has a past medical history of Coronary artery disease involving native coronary artery of native heart without angina pectoris (03/05/2024), Longstanding persistent atrial fibrillation (CMS/HCC) (03/05/2024), and PAF (paroxysmal atrial fibrillation) (CMS/HCC) (03/05/2024).    Past Surgical History:  He has a past surgical history that includes  "Other surgical history (11/18/2022).      Social History:  He reports that he has never smoked. He has never used smokeless tobacco. He reports that he does not drink alcohol and does not use drugs.    Family History:  Family History   Problem Relation Name Age of Onset    Hypertension Mother      Hyperlipidemia Mother      Heart attack Mother      Hypertension Father      Hyperlipidemia Father      Heart attack Father          Allergies:  Patient has no known allergies.    Outpatient Medications:  Current Outpatient Medications   Medication Instructions    amLODIPine (NORVASC) 5 mg, oral, Daily    Eliquis 5 mg, oral, 2 times daily    HumaLOG Mix 75-25 KwikPen 100 unit/mL (75-25) injection INJECT 42 UNIT TWICE DAILY    Jardiance 25 mg 1 tablet, oral, Daily    ketoconazole (NIZOral) 2 % shampoo Topical, 3 times weekly    ketoconazole-iodoquinoL-hc 2-1-2.5 % cream Apply a thin layer to the affect area in the morning and at night    lisinopriL-hydrochlorothiazide 20-25 mg tablet 1 tablet, oral, Daily    metFORMIN (GLUCOPHAGE) 850 mg, oral, 2 times daily    metoprolol tartrate (LOPRESSOR) 12.5 mg, oral, 2 times daily    OneTouch Delica Plus Lancet 30 gauge misc FOR GLUCOSE TESTING 3 TIMES DAILY    OneTouch Verio Flex meter misc FOR GLUCOSE TESTING 3 TIMES DAILY    OneTouch Verio test strips strip TEST 3 TIMES DAILY.    pen needle, diabetic (BD Gina 2nd Gen Pen Needle) 32 gauge x 5/32\" needle USE AS DIRECTED    simvastatin (ZOCOR) 40 mg, oral, Nightly       Physical Examination   Vitals:  Visit Vitals  /61 (BP Location: Right arm, Patient Position: Sitting, BP Cuff Size: Large adult)   Pulse 57   Ht 1.854 m (6' 1\")   Wt 109 kg (241 lb 6.4 oz)   BMI 31.85 kg/m²   Smoking Status Never   BSA 2.37 m²      Physical Exam  Vitals reviewed.   Constitutional:       General: He is not in acute distress.     Appearance: Normal appearance.   HENT:      Head: Normocephalic and atraumatic.      Nose: Nose normal.   Eyes:      " "Conjunctiva/sclera: Conjunctivae normal.   Cardiovascular:      Rate and Rhythm: Normal rate. Rhythm irregularly irregular.      Pulses: Normal pulses.      Heart sounds: No murmur heard.  Pulmonary:      Effort: Pulmonary effort is normal. No respiratory distress.      Breath sounds: Normal breath sounds. No wheezing, rhonchi or rales.   Abdominal:      General: Bowel sounds are normal. There is no distension.      Palpations: Abdomen is soft.      Tenderness: There is no abdominal tenderness.   Musculoskeletal:         General: No swelling.      Right lower leg: No edema.      Left lower leg: No edema.   Skin:     General: Skin is warm and dry.      Capillary Refill: Capillary refill takes less than 2 seconds.   Neurological:      General: No focal deficit present.      Mental Status: He is alert.   Psychiatric:         Mood and Affect: Mood normal.           Labs/Imaging/Cardiac Studies     Last Labs:  CBC -  No results found for: \"WBC\", \"HGB\", \"HCT\", \"MCV\", \"PLT\"    CMP -  Lab Results   Component Value Date    CALCIUM 9.2 02/21/2024    PROT 7.6 02/21/2024    ALBUMIN 3.8 02/21/2024    AST 14 02/21/2024    ALT 16 02/21/2024    ALKPHOS 83 02/21/2024    BILITOT 0.7 02/21/2024       LIPID PANEL -   Lab Results   Component Value Date    CHOL 104 02/21/2024    HDL 48.0 02/21/2024    CHHDL 2.2 02/21/2024    VLDL 13 02/21/2024    TRIG 67 02/21/2024    NHDL 56 02/21/2024       RENAL FUNCTION PANEL -   Lab Results   Component Value Date    K 4.6 02/21/2024       Lab Results   Component Value Date    HGBA1C 7.0 (H) 01/18/2024       ECG:    Echo:  No echocardiogram results found for the past 12 months       Assessment and Recommendations     Assessment/Plan     1. Pure hypercholesterolemia  Hyperlipidemia: The patient's lipids are well controlled on chronic statin therapy and they are meeting their goal LDL cholesterol per the ACC/AHA guidelines.  The patient is compliant and tolerating statin therapy and is following a " heart health diet and performs regular exercise.  The benefits of lipid lowering therapy have been discussed with the patient/family/caregiver.       2. Primary hypertension  Hypertension: The patient's blood pressure has been well-controlled at today's appointment or by recent primary care provider's measurements/home measurements and meets their goal blood pressure per the ACC/AHA guidelines.  The patient has been compliant with their anti-hypertensive medications and is following a low sodium/DASH diet and performs regular exercise.  I advised continuation of their present medical treatment and lifestyle modification.        3. Coronary artery disease involving native coronary artery of native heart without angina pectoris  CAD: The patient's CAD, as detailed in the HPI, has been clinically stable, without any anginal symptoms or dyspnea.  The patient will continue treatment with guideline-directed medical therapy with antiplatelet and statin medications and will continue regular exercise and a heart healthy diet.          4. Longstanding persistent atrial fibrillation (CMS/HCC)  Persistent trial fibrillation: The patient has been clinically stable, asymptomatic with persistent, rate-controlled atrial fibrillation as detailed in the HPI.  They will continue treatment with  anticoagulation for stroke prophylaxis based upon their present [id number] score, the risks and benefits of which were discussed with the patient/family/caregiver. Stop metoprolol for slow AF.       Orlando Russell MD    Exclusive of any other services or procedures performed, I, Orlando Russell MD , spent 30 minutes in duration for this visit today.  This time consisted of chart review, obtaining history, and/or performing the exam as documented above as well as documenting the clinical information for the encounter in the electronic record, discussing treatment options, plans, and/or goals with patient, family, and/or caregiver, refilling  medications, updating the electronic record, ordering medicines, lab work, imaging, referrals, and/or procedures as documented above and communicating with other [facility] professionals. I have discussed the results of laboratory, radiology, and cardiology studies with the patient and their family/caregiver.

## 2024-04-25 ENCOUNTER — LAB (OUTPATIENT)
Dept: LAB | Facility: LAB | Age: 79
End: 2024-04-25
Payer: MEDICARE

## 2024-04-25 DIAGNOSIS — E11.9 TYPE 2 DIABETES MELLITUS WITHOUT COMPLICATION, WITH LONG-TERM CURRENT USE OF INSULIN (MULTI): ICD-10-CM

## 2024-04-25 DIAGNOSIS — Z79.4 TYPE 2 DIABETES MELLITUS WITHOUT COMPLICATION, WITH LONG-TERM CURRENT USE OF INSULIN (MULTI): Primary | ICD-10-CM

## 2024-04-25 DIAGNOSIS — E11.9 TYPE 2 DIABETES MELLITUS WITHOUT COMPLICATION, WITH LONG-TERM CURRENT USE OF INSULIN (MULTI): Primary | ICD-10-CM

## 2024-04-25 DIAGNOSIS — Z79.4 TYPE 2 DIABETES MELLITUS WITHOUT COMPLICATION, WITH LONG-TERM CURRENT USE OF INSULIN (MULTI): ICD-10-CM

## 2024-04-25 PROCEDURE — 83036 HEMOGLOBIN GLYCOSYLATED A1C: CPT

## 2024-04-25 PROCEDURE — 36415 COLL VENOUS BLD VENIPUNCTURE: CPT

## 2024-04-25 RX ORDER — METFORMIN HYDROCHLORIDE 850 MG/1
850 TABLET ORAL 2 TIMES DAILY
Qty: 180 TABLET | Refills: 3 | Status: SHIPPED | OUTPATIENT
Start: 2024-04-25 | End: 2025-04-25

## 2024-04-26 LAB
EST. AVERAGE GLUCOSE BLD GHB EST-MCNC: 143 MG/DL
HBA1C MFR BLD: 6.6 %

## 2024-05-02 ENCOUNTER — OFFICE VISIT (OUTPATIENT)
Dept: ENDOCRINOLOGY | Facility: CLINIC | Age: 79
End: 2024-05-02
Payer: MEDICARE

## 2024-05-02 VITALS
SYSTOLIC BLOOD PRESSURE: 149 MMHG | DIASTOLIC BLOOD PRESSURE: 67 MMHG | HEART RATE: 57 BPM | BODY MASS INDEX: 31.99 KG/M2 | WEIGHT: 242.51 LBS

## 2024-05-02 DIAGNOSIS — Z79.4 TYPE 2 DIABETES MELLITUS WITHOUT COMPLICATION, WITH LONG-TERM CURRENT USE OF INSULIN (MULTI): Primary | ICD-10-CM

## 2024-05-02 DIAGNOSIS — E11.9 TYPE 2 DIABETES MELLITUS WITHOUT COMPLICATION, WITH LONG-TERM CURRENT USE OF INSULIN (MULTI): Primary | ICD-10-CM

## 2024-05-02 PROCEDURE — 3078F DIAST BP <80 MM HG: CPT | Performed by: INTERNAL MEDICINE

## 2024-05-02 PROCEDURE — 1036F TOBACCO NON-USER: CPT | Performed by: INTERNAL MEDICINE

## 2024-05-02 PROCEDURE — 99214 OFFICE O/P EST MOD 30 MIN: CPT | Performed by: INTERNAL MEDICINE

## 2024-05-02 PROCEDURE — 3077F SYST BP >= 140 MM HG: CPT | Performed by: INTERNAL MEDICINE

## 2024-05-02 PROCEDURE — 1160F RVW MEDS BY RX/DR IN RCRD: CPT | Performed by: INTERNAL MEDICINE

## 2024-05-02 PROCEDURE — 1159F MED LIST DOCD IN RCRD: CPT | Performed by: INTERNAL MEDICINE

## 2024-05-02 RX ORDER — INSULIN LISPRO 100 [IU]/ML
30 INJECTION, SUSPENSION SUBCUTANEOUS
Qty: 60 ML | Refills: 3 | Status: SHIPPED | OUTPATIENT
Start: 2024-05-02

## 2024-05-02 ASSESSMENT — ENCOUNTER SYMPTOMS
CONSTIPATION: 0
FREQUENCY: 0
ARTHRALGIAS: 0
SHORTNESS OF BREATH: 0
APPETITE CHANGE: 0
DIARRHEA: 0
VOMITING: 0
ABDOMINAL PAIN: 0
SORE THROAT: 0
NAUSEA: 0
NERVOUS/ANXIOUS: 0
FEVER: 0
POLYDIPSIA: 0
HEADACHES: 0
COUGH: 0

## 2024-05-02 NOTE — LETTER
"May 2, 2024     Faviola Lombardo DO  [address]  [facility] [place], [address]  [facility] [address]    Patient: TUNDE Khanna   YOB: 1945   Date of Visit: 5/2/2024       Dear Dr. Faviola Lombardo DO:    Thank you for referring ED Jey to me for evaluation. Below are my notes for this consultation.  If you have questions, please do not hesitate to call me. I look forward to following your patient along with you.       Sincerely,     Nitin Novak MD      CC: No Recipients  ______________________________________________________________________________________    History Of Present Illness  Sean Khanna \"ED\" is a 78 y.o. male     Duration of type 2 diabetes mellitus:  6 years  Complications:  Coronary artery disease    Humalog Mix 75/25  Breakfast 35 units  Dinner 35 units     Jardiance 25 mg/day  History of leg pains which improved with hydratioon    Metformin 850 mg BID    FreeStyle Neyda 2  Patient is testing glucose 288 times daily  Records reviewed, on file     Last eye exam:      He is still having overnight hypoglycemia    Last eye exam:  March 2024    Past Medical History  He has a past medical history of Coronary artery disease involving native coronary artery of native heart without angina pectoris (03/05/2024), Longstanding persistent atrial fibrillation (Multi) (03/05/2024), and PAF (paroxysmal atrial fibrillation) (Multi) (03/05/2024).    Surgical History  He has a past surgical history that includes Other surgical history (11/18/2022).     Social History  He reports that he has never smoked. He has never used smokeless tobacco. He reports that he does not drink alcohol and does not use drugs.    Family History  Family History   Problem Relation Name Age of Onset   • Hypertension Mother     • Hyperlipidemia Mother     • Heart attack Mother     • Hypertension Father     • Hyperlipidemia Father     • Heart attack Father         Medications  Current Outpatient Medications " "  Medication Instructions   • amLODIPine (NORVASC) 5 mg, oral, Daily   • Eliquis 5 mg, oral, 2 times daily   • HumaLOG Mix 75-25 KwikPen 100 unit/mL (75-25) injection INJECT 42 UNIT TWICE DAILY   • Jardiance 25 mg, oral, Daily   • ketoconazole (NIZOral) 2 % shampoo Topical, 3 times weekly   • ketoconazole-iodoquinoL-hc 2-1-2.5 % cream Apply a thin layer to the affect area in the morning and at night   • lisinopriL-hydrochlorothiazide 20-25 mg tablet 1 tablet, oral, Daily   • metFORMIN (GLUCOPHAGE) 850 mg, oral, 2 times daily   • OneTouch Delica Plus Lancet 30 gauge misc FOR GLUCOSE TESTING 3 TIMES DAILY   • OneTouch Verio Flex meter misc FOR GLUCOSE TESTING 3 TIMES DAILY   • OneTouch Verio test strips strip TEST 3 TIMES DAILY.   • pen needle, diabetic (BD Gina 2nd Gen Pen Needle) 32 gauge x 5/32\" needle USE AS DIRECTED   • simvastatin (ZOCOR) 40 mg, oral, Nightly       Allergies  Patient has no known allergies.    Review of Systems   Constitutional:  Negative for appetite change and fever.   HENT:  Negative for sore throat.         Denies dry mouth   Eyes:  Negative for visual disturbance.   Respiratory:  Negative for cough and shortness of breath.    Cardiovascular:  Positive for leg swelling. Negative for chest pain.   Gastrointestinal:  Negative for abdominal pain, constipation, diarrhea, nausea and vomiting.   Endocrine: Negative for polydipsia and polyuria.   Genitourinary:  Negative for frequency.   Musculoskeletal:  Negative for arthralgias.   Skin:  Negative for rash.   Neurological:  Negative for headaches.   Psychiatric/Behavioral:  The patient is not nervous/anxious.          Last Recorded Vitals  Blood pressure 149/67, pulse 57, weight 110 kg (242 lb 8.1 oz).    Physical Exam  Constitutional:       General: He is not in acute distress.  HENT:      Head: Normocephalic.      Mouth/Throat:      Mouth: Mucous membranes are moist.   Eyes:      Extraocular Movements: Extraocular movements intact.   Neck:      " Thyroid: No thyromegaly.   Cardiovascular:      Pulses:           Radial pulses are 2+ on the right side and 2+ on the left side.        Dorsalis pedis pulses are 2+ on the right side and 1+ on the left side.      Comments: Trace ankle edema bilat  Feet:      Comments: Flat feet  Skin:     Comments: No foot sores   Neurological:      Mental Status: He is alert.      Motor: No tremor.   Psychiatric:         Mood and Affect: Affect normal.          Relevant Results  Glucose (mg/dL)   Date Value   02/21/2024 91   10/17/2023 135 (H)   10/16/2023 90     Hemoglobin A1C (%)   Date Value   04/25/2024 6.6 (H)   01/18/2024 7.0 (H)   10/16/2023 7.4 (H)     Bicarbonate (mmol/L)   Date Value   02/21/2024 29   10/17/2023 26   10/16/2023 28     Urea Nitrogen (mg/dL)   Date Value   02/21/2024 28 (H)   10/17/2023 31 (H)   10/16/2023 36 (H)     Creatinine (mg/dL)   Date Value   02/21/2024 1.28   10/17/2023 1.37 (H)   10/16/2023 1.41 (H)     Lab Results   Component Value Date    CHOL 104 02/21/2024    CHOL 111 10/16/2023    CHOL 89 02/16/2023     Lab Results   Component Value Date    HDL 48.0 02/21/2024    HDL 59.5 10/16/2023    HDL 36.1 (A) 02/16/2023     Lab Results   Component Value Date    LDLCALC 43 02/21/2024    LDLCALC 39 10/16/2023     Lab Results   Component Value Date    TRIG 67 02/21/2024    TRIG 61 10/16/2023    TRIG 75 02/16/2023       IMPRESSION  TYPE 2 DIABETES MELLITUS   LONG TERM CURRENT INSULIN USE  Excellent overall glucose control  He still has nocturnal hypoglycemia      RECOMMENDATIONS  Decrease Humalog Mix 75/25 to 30 units twice daily     Follow up 3-4 months  Repeat A1c before next appointment if not already done.    Follow up ankle swelling, role of amlodipine, with Dr. Lombardo

## 2024-05-02 NOTE — PROGRESS NOTES
"History Of Present Illness  Sean Khanna \"ED\" is a 78 y.o. male     Duration of type 2 diabetes mellitus:  6 years  Complications:  Coronary artery disease    Humalog Mix 75/25  Breakfast 35 units  Dinner 35 units     Jardiance 25 mg/day  History of leg pains which improved with hydratioon    Metformin 850 mg BID    FreeStyle Neyda 2  Patient is testing glucose 288 times daily  Records reviewed, on file     Last eye exam:      He is still having overnight hypoglycemia    Last eye exam:  March 2024    Past Medical History  He has a past medical history of Coronary artery disease involving native coronary artery of native heart without angina pectoris (03/05/2024), Longstanding persistent atrial fibrillation (Multi) (03/05/2024), and PAF (paroxysmal atrial fibrillation) (Multi) (03/05/2024).    Surgical History  He has a past surgical history that includes Other surgical history (11/18/2022).     Social History  He reports that he has never smoked. He has never used smokeless tobacco. He reports that he does not drink alcohol and does not use drugs.    Family History  Family History   Problem Relation Name Age of Onset    Hypertension Mother      Hyperlipidemia Mother      Heart attack Mother      Hypertension Father      Hyperlipidemia Father      Heart attack Father         Medications  Current Outpatient Medications   Medication Instructions    amLODIPine (NORVASC) 5 mg, oral, Daily    Eliquis 5 mg, oral, 2 times daily    HumaLOG Mix 75-25 KwikPen 100 unit/mL (75-25) injection INJECT 42 UNIT TWICE DAILY    Jardiance 25 mg, oral, Daily    ketoconazole (NIZOral) 2 % shampoo Topical, 3 times weekly    ketoconazole-iodoquinoL-hc 2-1-2.5 % cream Apply a thin layer to the affect area in the morning and at night    lisinopriL-hydrochlorothiazide 20-25 mg tablet 1 tablet, oral, Daily    metFORMIN (GLUCOPHAGE) 850 mg, oral, 2 times daily    OneTouch Delica Plus Lancet 30 gauge misc FOR GLUCOSE TESTING 3 TIMES DAILY    " "OneTouch Verio Flex meter misc FOR GLUCOSE TESTING 3 TIMES DAILY    OneTouch Verio test strips strip TEST 3 TIMES DAILY.    pen needle, diabetic (BD Gina 2nd Gen Pen Needle) 32 gauge x 5/32\" needle USE AS DIRECTED    simvastatin (ZOCOR) 40 mg, oral, Nightly       Allergies  Patient has no known allergies.    Review of Systems   Constitutional:  Negative for appetite change and fever.   HENT:  Negative for sore throat.         Denies dry mouth   Eyes:  Negative for visual disturbance.   Respiratory:  Negative for cough and shortness of breath.    Cardiovascular:  Positive for leg swelling. Negative for chest pain.   Gastrointestinal:  Negative for abdominal pain, constipation, diarrhea, nausea and vomiting.   Endocrine: Negative for polydipsia and polyuria.   Genitourinary:  Negative for frequency.   Musculoskeletal:  Negative for arthralgias.   Skin:  Negative for rash.   Neurological:  Negative for headaches.   Psychiatric/Behavioral:  The patient is not nervous/anxious.          Last Recorded Vitals  Blood pressure 149/67, pulse 57, weight 110 kg (242 lb 8.1 oz).    Physical Exam  Constitutional:       General: He is not in acute distress.  HENT:      Head: Normocephalic.      Mouth/Throat:      Mouth: Mucous membranes are moist.   Eyes:      Extraocular Movements: Extraocular movements intact.   Neck:      Thyroid: No thyromegaly.   Cardiovascular:      Pulses:           Radial pulses are 2+ on the right side and 2+ on the left side.        Dorsalis pedis pulses are 2+ on the right side and 1+ on the left side.      Comments: Trace ankle edema bilat  Feet:      Comments: Flat feet  Skin:     Comments: No foot sores   Neurological:      Mental Status: He is alert.      Motor: No tremor.   Psychiatric:         Mood and Affect: Affect normal.          Relevant Results  Glucose (mg/dL)   Date Value   02/21/2024 91   10/17/2023 135 (H)   10/16/2023 90     Hemoglobin A1C (%)   Date Value   04/25/2024 6.6 (H) "   01/18/2024 7.0 (H)   10/16/2023 7.4 (H)     Bicarbonate (mmol/L)   Date Value   02/21/2024 29   10/17/2023 26   10/16/2023 28     Urea Nitrogen (mg/dL)   Date Value   02/21/2024 28 (H)   10/17/2023 31 (H)   10/16/2023 36 (H)     Creatinine (mg/dL)   Date Value   02/21/2024 1.28   10/17/2023 1.37 (H)   10/16/2023 1.41 (H)     Lab Results   Component Value Date    CHOL 104 02/21/2024    CHOL 111 10/16/2023    CHOL 89 02/16/2023     Lab Results   Component Value Date    HDL 48.0 02/21/2024    HDL 59.5 10/16/2023    HDL 36.1 (A) 02/16/2023     Lab Results   Component Value Date    LDLCALC 43 02/21/2024    LDLCALC 39 10/16/2023     Lab Results   Component Value Date    TRIG 67 02/21/2024    TRIG 61 10/16/2023    TRIG 75 02/16/2023       IMPRESSION  TYPE 2 DIABETES MELLITUS   LONG TERM CURRENT INSULIN USE  Excellent overall glucose control  He still has nocturnal hypoglycemia      RECOMMENDATIONS  Decrease Humalog Mix 75/25 to 30 units twice daily     Follow up 3-4 months  Repeat A1c before next appointment if not already done.    Follow up ankle swelling, role of amlodipine, with Dr. Lombardo

## 2024-05-02 NOTE — PATIENT INSTRUCTIONS
RECOMMENDATIONS  Decrease Humalog Mix 75/25 to 30 units twice daily     Follow up 3-4 months  Repeat A1c before next appointment if not already done.    Follow up ankle swelling, role of amlodipine, with Dr. Lombardo

## 2024-05-08 ENCOUNTER — OFFICE VISIT (OUTPATIENT)
Dept: PRIMARY CARE | Facility: CLINIC | Age: 79
End: 2024-05-08
Payer: COMMERCIAL

## 2024-05-08 ENCOUNTER — TELEPHONE (OUTPATIENT)
Dept: PRIMARY CARE | Facility: CLINIC | Age: 79
End: 2024-05-08

## 2024-05-08 ENCOUNTER — HOSPITAL ENCOUNTER (OUTPATIENT)
Dept: RADIOLOGY | Facility: CLINIC | Age: 79
Discharge: HOME | End: 2024-05-08
Payer: COMMERCIAL

## 2024-05-08 VITALS — SYSTOLIC BLOOD PRESSURE: 130 MMHG | DIASTOLIC BLOOD PRESSURE: 64 MMHG | HEART RATE: 70 BPM | OXYGEN SATURATION: 96 %

## 2024-05-08 DIAGNOSIS — I48.20 CHRONIC ATRIAL FIBRILLATION (MULTI): ICD-10-CM

## 2024-05-08 DIAGNOSIS — M79.89 LEG SWELLING: Primary | ICD-10-CM

## 2024-05-08 DIAGNOSIS — M79.89 LEG SWELLING: ICD-10-CM

## 2024-05-08 DIAGNOSIS — I10 ESSENTIAL (PRIMARY) HYPERTENSION: ICD-10-CM

## 2024-05-08 DIAGNOSIS — I48.91 ATRIAL FIBRILLATION, UNSPECIFIED TYPE (MULTI): ICD-10-CM

## 2024-05-08 PROCEDURE — 93970 EXTREMITY STUDY: CPT | Performed by: RADIOLOGY

## 2024-05-08 PROCEDURE — 99214 OFFICE O/P EST MOD 30 MIN: CPT | Performed by: STUDENT IN AN ORGANIZED HEALTH CARE EDUCATION/TRAINING PROGRAM

## 2024-05-08 PROCEDURE — 3075F SYST BP GE 130 - 139MM HG: CPT | Performed by: STUDENT IN AN ORGANIZED HEALTH CARE EDUCATION/TRAINING PROGRAM

## 2024-05-08 PROCEDURE — 1160F RVW MEDS BY RX/DR IN RCRD: CPT | Performed by: STUDENT IN AN ORGANIZED HEALTH CARE EDUCATION/TRAINING PROGRAM

## 2024-05-08 PROCEDURE — 3078F DIAST BP <80 MM HG: CPT | Performed by: STUDENT IN AN ORGANIZED HEALTH CARE EDUCATION/TRAINING PROGRAM

## 2024-05-08 PROCEDURE — 1036F TOBACCO NON-USER: CPT | Performed by: STUDENT IN AN ORGANIZED HEALTH CARE EDUCATION/TRAINING PROGRAM

## 2024-05-08 PROCEDURE — 1159F MED LIST DOCD IN RCRD: CPT | Performed by: STUDENT IN AN ORGANIZED HEALTH CARE EDUCATION/TRAINING PROGRAM

## 2024-05-08 PROCEDURE — 93970 EXTREMITY STUDY: CPT

## 2024-05-08 RX ORDER — LISINOPRIL 10 MG/1
10 TABLET ORAL DAILY
Qty: 30 TABLET | Refills: 0 | Status: SHIPPED | OUTPATIENT
Start: 2024-05-08 | End: 2024-05-14 | Stop reason: SDUPTHER

## 2024-05-08 RX ORDER — AMLODIPINE BESYLATE 5 MG/1
5 TABLET ORAL DAILY
Qty: 90 TABLET | Refills: 1 | Status: CANCELLED | OUTPATIENT
Start: 2024-05-08

## 2024-05-08 NOTE — PROGRESS NOTES
Subjective   Reason for Visit: Sean Khanna is an 78 y.o. male here for a problem visit    HPI  1.  Swelling    Recently saw his endocrinologist who noted bilateral leg swelling  Feels that it could be due to amlodipine/medication  Patient is coming into the office today to discuss this further  Asking for advice/recommendations    Patient noted that recently he noted bilateral leg cramping after walking but ultimately discussed this with his endocrinologist who told him to stay hydrated as that could have been affected from the diabetes medicines    Ultimately he did drink some Gatorade and all of these symptoms fully resolved    Since then, he has been noticing bilateral lower extremity edema and now mainly within his feet    Denies any calf pain currently or any shortness of breath, chest pain, palpitations, or any other acute signs/symptoms  He does have a strong history of atrial fibrillation and recently established with a new cardiologist, Dr. Russell earlier this year    Also, continues to take Eliquis due to his [id number] score    No Known Allergies    Immunization History   Administered Date(s) Administered    Influenza, Seasonal, Quadrivalent, Adjuvanted 10/02/2023    Influenza, seasonal, injectable 10/02/2023    Moderna COVID-19 vaccine, bivalent, blue cap/gray label *Check age/dose* 11/10/2022    Pfizer COVID-19 vaccine, Fall 2023, 12 years and older, (30mcg/0.3mL) 10/05/2023    Pfizer Purple Cap SARS-CoV-2 10/05/2023    Pneumococcal conjugate vaccine, 13-valent (PREVNAR 13) 10/02/2018    Pneumococcal polysaccharide vaccine, 23-valent, age 2 years and older (PNEUMOVAX 23) 12/10/2019    RSV, 60 Years And Older (AREXVY) 10/16/2023    Zoster vaccine, recombinant, adult (SHINGRIX) 02/08/2024       Review of Systems  All pertinent positive symptoms are included in the history of present illness.    All other systems have been reviewed and are negative and noncontributory to this patient's current  ailments.    Objective   Vitals:  /64 (BP Location: Left arm, Patient Position: Sitting, BP Cuff Size: Adult)   Pulse 70   SpO2 96%     Lab on 04/25/2024   Component Date Value Ref Range Status    Hemoglobin A1C 04/25/2024 6.6 (H)  see below % Final    Estimated Average Glucose 04/25/2024 143  Not Established mg/dL Final   Lab on 02/21/2024   Component Date Value Ref Range Status    Cholesterol 02/21/2024 104  0 - 199 mg/dL Final          Age      Desirable   Borderline High   High     0-19 Y     0 - 169       170 - 199     >/= 200    20-24 Y     0 - 189       190 - 224     >/= 225         >24 Y     0 - 199       200 - 239     >/= 240   **All ranges are based on fasting samples. Specific   therapeutic targets will vary based on patient-specific   cardiac risk.    Pediatric guidelines reference:Pediatrics 2011, 128(S5).Adult guidelines reference: NCEP ATPIII Guidelines,ABI 2001, 258:2486-97    Venipuncture immediately after or during the administration of Metamizole may lead to falsely low results. Testing should be performed immediately prior to Metamizole dosing.    HDL-Cholesterol 02/21/2024 48.0  mg/dL Final      Age       Very Low   Low     Normal    High    0-19 Y    < 35      < 40     40-45     ----  20-24 Y    ----     < 40      >45      ----        >24 Y      ----     < 40     40-60      >60      Cholesterol/HDL Ratio 02/21/2024 2.2   Final      Ref Values  Desirable  < 3.4  High Risk  > 5.0    LDL Calculated 02/21/2024 43  <=99 mg/dL Final                                Near   Borderline      AGE      Desirable  Optimal    High     High     Very High     0-19 Y     0 - 109     ---    110-129   >/= 130     ----    20-24 Y     0 - 119     ---    120-159   >/= 160     ----      >24 Y     0 -  99   100-129  130-159   160-189     >/=190      VLDL 02/21/2024 13  0 - 40 mg/dL Final    Triglycerides 02/21/2024 67  0 - 149 mg/dL Final       Age         Desirable   Borderline High   High     Very High   0  D-90 D    19 - 174         ----         ----        ----  91 D- 9 Y     0 -  74        75 -  99     >/= 100      ----    10-19 Y     0 -  89        90 - 129     >/= 130      ----    20-24 Y     0 - 114       115 - 149     >/= 150      ----         >24 Y     0 - 149       150 - 199    200- 499    >/= 500    Venipuncture immediately after or during the administration of Metamizole may lead to falsely low results. Testing should be performed immediately prior to Metamizole dosing.    Non HDL Cholesterol 02/21/2024 56  0 - 149 mg/dL Final          Age       Desirable   Borderline High   High     Very High     0-19 Y     0 - 119       120 - 144     >/= 145    >/= 160    20-24 Y     0 - 149       150 - 189     >/= 190      ----         >24 Y    30 mg/dL above LDL Cholesterol goal      Glucose 02/21/2024 91  74 - 99 mg/dL Final    Sodium 02/21/2024 139  136 - 145 mmol/L Final    Potassium 02/21/2024 4.6  3.5 - 5.3 mmol/L Final    Chloride 02/21/2024 103  98 - 107 mmol/L Final    Bicarbonate 02/21/2024 29  21 - 32 mmol/L Final    Anion Gap 02/21/2024 12  10 - 20 mmol/L Final    Urea Nitrogen 02/21/2024 28 (H)  6 - 23 mg/dL Final    Creatinine 02/21/2024 1.28  0.50 - 1.30 mg/dL Final    eGFR 02/21/2024 58 (L)  >60 mL/min/1.73m*2 Final    Calculations of estimated GFR are performed using the 2021 CKD-EPI Study Refit equation without the race variable for the IDMS-Traceable creatinine methods.  https://jasn.asnjournals.org/content/early/2021/09/22/ASN.7643951722    Calcium 02/21/2024 9.2  8.6 - 10.3 mg/dL Final    Albumin 02/21/2024 3.8  3.4 - 5.0 g/dL Final    Alkaline Phosphatase 02/21/2024 83  33 - 136 U/L Final    Total Protein 02/21/2024 7.6  6.4 - 8.2 g/dL Final    AST 02/21/2024 14  9 - 39 U/L Final    Bilirubin, Total 02/21/2024 0.7  0.0 - 1.2 mg/dL Final    ALT 02/21/2024 16  10 - 52 U/L Final    Patients treated with Sulfasalazine may generate falsely decreased results for ALT.    Prostate Specific Antigen,Screen  02/21/2024 2.94  <=4.00 ng/mL Final   Office Visit on 01/18/2024   Component Date Value Ref Range Status    Tissue/Wound Culture/Smear 01/18/2024 (1+) Rare Mixed Skin Microorganisms   Final    Gram Stain 01/18/2024 (1+) Rare Polymorphonuclear leukocytes   Final    Gram Stain 01/18/2024 No organisms seen   Final   Lab on 01/18/2024   Component Date Value Ref Range Status    Hemoglobin A1C 01/18/2024 7.0 (H)  see below % Final    Estimated Average Glucose 01/18/2024 154  Not Established mg/dL Final       Physical Exam    CONSTITUTIONAL - well nourished, well developed, looks like stated age, in no acute distress, not ill-appearing, and not tired appearing  SKIN - normal skin color and pigmentation, normal skin turgor without rash, lesions, or nodules visualized  HEAD - no trauma, normocephalic  EYES - normal external exam  CHEST -no distressed breathing, good effort, heart regular rate and rhythm, no murmurs, rubs, or gallops, lungs clear to auscultation bilaterally  EXTREMITIES -2+ pitting edema bilateral lower extremities/feet, travels up to mid ankle, negative calf pain on palpation or any warmth  NEUROLOGICAL - normal balance, normal motor, no ataxia  PSYCHIATRIC - alert, pleasant and cordial, age-appropriate    Assessment/Plan   1.  Hypertension/lower leg extremity edema/atrial fibrillation    We had a long conversation about your signs/symptoms and I truly believe that this could be from your amlodipine as a side effect    I would recommend we discontinue this medication and we will start lisinopril 10 mg to take alongside your other lisinopril/HCTZ dosage of 20-25 mg daily    This will equate to 30 mg of lisinopril daily    To be fully thorough, I did order bilateral lower extremity ultrasounds to make sure there are no clots underlying  Please have this done at your earliest mutual convenience and we will notify you with the results    I also recommend that you contact your cardiologist to make sure there is  nothing underlying involving a congestive heart failure  I understand you just established 2 months ago but I would like you to make a follow-up appointment if this continues to be an issue    Lastly, please monitor your blood pressures closely over the next week and I would like close follow-up and have you come back into the office early next week so we can monitor your blood pressure as well as follow-up with the swallowing    At any point if you notice worsening or acute signs/symptoms, please contact me immediately and/or go to the nearest emergency room

## 2024-05-08 NOTE — TELEPHONE ENCOUNTER
----- Message from Faviola Lombardo DO sent at 5/8/2024  4:40 PM EDT -----  Ultrasound of bilateral lower extremities are normal and has no evidence of any DVTs    This is pointing towards the possibility of the amlodipine causing his signs/symptoms of swelling or even a heart condition    I would recommend he follows up with myself at his scheduled appointment on 5/14 of next week      Spoke w/pt, pt aware of results

## 2024-05-14 ENCOUNTER — OFFICE VISIT (OUTPATIENT)
Dept: PRIMARY CARE | Facility: CLINIC | Age: 79
End: 2024-05-14
Payer: COMMERCIAL

## 2024-05-14 VITALS — DIASTOLIC BLOOD PRESSURE: 70 MMHG | HEART RATE: 55 BPM | SYSTOLIC BLOOD PRESSURE: 120 MMHG | OXYGEN SATURATION: 99 %

## 2024-05-14 DIAGNOSIS — I10 ESSENTIAL (PRIMARY) HYPERTENSION: ICD-10-CM

## 2024-05-14 PROCEDURE — 99213 OFFICE O/P EST LOW 20 MIN: CPT | Performed by: STUDENT IN AN ORGANIZED HEALTH CARE EDUCATION/TRAINING PROGRAM

## 2024-05-14 PROCEDURE — 1160F RVW MEDS BY RX/DR IN RCRD: CPT | Performed by: STUDENT IN AN ORGANIZED HEALTH CARE EDUCATION/TRAINING PROGRAM

## 2024-05-14 PROCEDURE — 3074F SYST BP LT 130 MM HG: CPT | Performed by: STUDENT IN AN ORGANIZED HEALTH CARE EDUCATION/TRAINING PROGRAM

## 2024-05-14 PROCEDURE — 3078F DIAST BP <80 MM HG: CPT | Performed by: STUDENT IN AN ORGANIZED HEALTH CARE EDUCATION/TRAINING PROGRAM

## 2024-05-14 PROCEDURE — 1159F MED LIST DOCD IN RCRD: CPT | Performed by: STUDENT IN AN ORGANIZED HEALTH CARE EDUCATION/TRAINING PROGRAM

## 2024-05-14 PROCEDURE — 1036F TOBACCO NON-USER: CPT | Performed by: STUDENT IN AN ORGANIZED HEALTH CARE EDUCATION/TRAINING PROGRAM

## 2024-05-14 RX ORDER — LISINOPRIL 10 MG/1
10 TABLET ORAL DAILY
Qty: 90 TABLET | Refills: 1 | Status: SHIPPED | OUTPATIENT
Start: 2024-05-14

## 2024-05-14 NOTE — PROGRESS NOTES
Subjective   Reason for Visit: Sean Khanna is an 78 y.o. male here for a problem visit    HPI  1.  Lower extremity edema  Patient returns to clinic for follow-up after last week's visit for bilateral lower extremity edema.  Visit he was consulted that he is edema most likely was from amlodipine which was discontinued.  Also lisinopril was increased to 30 mg daily to take alongside hydrochlorothiazide 25 mg daily and he was instructed to monitor blood pressure.  Blood pressure has been well-controlled with a systolic value between upper 110s to 130 and diastolic values between 65 and 77.  Denies any medication side effects, and admits of feeling well on this regimen    Patient noted that recently he noted bilateral leg cramping after walking but ultimately discussed this with his endocrinologist who told him to stay hydrated as that could have been affected from the diabetes medicines.  Admits his leg cramping symptom has resolved and he is drinking more fluids now.     Denies any calf pain currently or any shortness of breath, chest pain, palpitations, or any other acute signs/symptoms  He does have a history of atrial fibrillation and recently established with a new cardiologist, Dr. Russell earlier this year and takes Eliquis due to his [id number] score  The ultrasound of lower extremity to assess for DVT came out negative    No Known Allergies    Immunization History   Administered Date(s) Administered    Influenza, Seasonal, Quadrivalent, Adjuvanted 10/02/2023    Influenza, seasonal, injectable 10/02/2023    Moderna COVID-19 vaccine, bivalent, blue cap/gray label *Check age/dose* 11/10/2022    Pfizer COVID-19 vaccine, Fall 2023, 12 years and older, (30mcg/0.3mL) 10/05/2023    Pfizer Purple Cap SARS-CoV-2 10/05/2023    Pneumococcal conjugate vaccine, 13-valent (PREVNAR 13) 10/02/2018    Pneumococcal polysaccharide vaccine, 23-valent, age 2 years and older (PNEUMOVAX 23) 12/10/2019    RSV, 60 Years And Older  (AREXVY) 10/16/2023    Zoster vaccine, recombinant, adult (SHINGRIX) 02/08/2024       Review of Systems  All pertinent positive symptoms are included in the history of present illness.    All other systems have been reviewed and are negative and noncontributory to this patient's current ailments.    Objective   Vitals:  /70 (BP Location: Left arm, Patient Position: Sitting, BP Cuff Size: Adult)   Pulse 55   SpO2 99%     Lab on 04/25/2024   Component Date Value Ref Range Status    Hemoglobin A1C 04/25/2024 6.6 (H)  see below % Final    Estimated Average Glucose 04/25/2024 143  Not Established mg/dL Final   Lab on 02/21/2024   Component Date Value Ref Range Status    Cholesterol 02/21/2024 104  0 - 199 mg/dL Final          Age      Desirable   Borderline High   High     0-19 Y     0 - 169       170 - 199     >/= 200    20-24 Y     0 - 189       190 - 224     >/= 225         >24 Y     0 - 199       200 - 239     >/= 240   **All ranges are based on fasting samples. Specific   therapeutic targets will vary based on patient-specific   cardiac risk.    Pediatric guidelines reference:Pediatrics 2011, 128(S5).Adult guidelines reference: NCEP ATPIII Guidelines,ABI 2001, 258:2486-97    Venipuncture immediately after or during the administration of Metamizole may lead to falsely low results. Testing should be performed immediately prior to Metamizole dosing.    HDL-Cholesterol 02/21/2024 48.0  mg/dL Final      Age       Very Low   Low     Normal    High    0-19 Y    < 35      < 40     40-45     ----  20-24 Y    ----     < 40      >45      ----        >24 Y      ----     < 40     40-60      >60      Cholesterol/HDL Ratio 02/21/2024 2.2   Final      Ref Values  Desirable  < 3.4  High Risk  > 5.0    LDL Calculated 02/21/2024 43  <=99 mg/dL Final                                Near   Borderline      AGE      Desirable  Optimal    High     High     Very High     0-19 Y     0 - 109     ---    110-129   >/= 130     ----     20-24 Y     0 - 119     ---    120-159   >/= 160     ----      >24 Y     0 -  99   100-129  130-159   160-189     >/=190      VLDL 02/21/2024 13  0 - 40 mg/dL Final    Triglycerides 02/21/2024 67  0 - 149 mg/dL Final       Age         Desirable   Borderline High   High     Very High   0 D-90 D    19 - 174         ----         ----        ----  91 D- 9 Y     0 -  74        75 -  99     >/= 100      ----    10-19 Y     0 -  89        90 - 129     >/= 130      ----    20-24 Y     0 - 114       115 - 149     >/= 150      ----         >24 Y     0 - 149       150 - 199    200- 499    >/= 500    Venipuncture immediately after or during the administration of Metamizole may lead to falsely low results. Testing should be performed immediately prior to Metamizole dosing.    Non HDL Cholesterol 02/21/2024 56  0 - 149 mg/dL Final          Age       Desirable   Borderline High   High     Very High     0-19 Y     0 - 119       120 - 144     >/= 145    >/= 160    20-24 Y     0 - 149       150 - 189     >/= 190      ----         >24 Y    30 mg/dL above LDL Cholesterol goal      Glucose 02/21/2024 91  74 - 99 mg/dL Final    Sodium 02/21/2024 139  136 - 145 mmol/L Final    Potassium 02/21/2024 4.6  3.5 - 5.3 mmol/L Final    Chloride 02/21/2024 103  98 - 107 mmol/L Final    Bicarbonate 02/21/2024 29  21 - 32 mmol/L Final    Anion Gap 02/21/2024 12  10 - 20 mmol/L Final    Urea Nitrogen 02/21/2024 28 (H)  6 - 23 mg/dL Final    Creatinine 02/21/2024 1.28  0.50 - 1.30 mg/dL Final    eGFR 02/21/2024 58 (L)  >60 mL/min/1.73m*2 Final    Calculations of estimated GFR are performed using the 2021 CKD-EPI Study Refit equation without the race variable for the IDMS-Traceable creatinine methods.  https://jasn.asnjournals.org/content/early/2021/09/22/ASN.2625347198    Calcium 02/21/2024 9.2  8.6 - 10.3 mg/dL Final    Albumin 02/21/2024 3.8  3.4 - 5.0 g/dL Final    Alkaline Phosphatase 02/21/2024 83  33 - 136 U/L Final    Total Protein 02/21/2024  7.6  6.4 - 8.2 g/dL Final    AST 02/21/2024 14  9 - 39 U/L Final    Bilirubin, Total 02/21/2024 0.7  0.0 - 1.2 mg/dL Final    ALT 02/21/2024 16  10 - 52 U/L Final    Patients treated with Sulfasalazine may generate falsely decreased results for ALT.    Prostate Specific Antigen,Screen 02/21/2024 2.94  <=4.00 ng/mL Final   Office Visit on 01/18/2024   Component Date Value Ref Range Status    Tissue/Wound Culture/Smear 01/18/2024 (1+) Rare Mixed Skin Microorganisms   Final    Gram Stain 01/18/2024 (1+) Rare Polymorphonuclear leukocytes   Final    Gram Stain 01/18/2024 No organisms seen   Final   Lab on 01/18/2024   Component Date Value Ref Range Status    Hemoglobin A1C 01/18/2024 7.0 (H)  see below % Final    Estimated Average Glucose 01/18/2024 154  Not Established mg/dL Final       Physical Exam    CONSTITUTIONAL - well nourished, well developed, looks like stated age, in no acute distress, not ill-appearing, and not tired appearing  SKIN - normal skin color and pigmentation, normal skin turgor without rash, lesions, or nodules visualized  HEAD - no trauma, normocephalic  EYES - normal external exam  CHEST -no distressed breathing, good effort, heart regular rate and but irregular rhythm, no murmurs, rubs, or gallops, lungs clear to auscultation bilaterally  EXTREMITIES -pitting edema has resolve, negative calf pain on palpation or any warmth  NEUROLOGICAL - normal balance, normal motor, no ataxia  PSYCHIATRIC - alert, pleasant and cordial, age-appropriate    Assessment/Plan   1.  Hypertension/lower leg extremity edema/atrial fibrillation  Blood pressure is well-controlled, today 120/70.  Will continue with lisinopril 30 mg daily and hydrochlorothiazide 25 mg daily.  Refills were sent to the pharmacy.  I would like to have you monitor and record blood pressures at home  Blood pressure goal should be below 130/80, ideally 120/80  Plan to follow-up in 3 months for his chronic condition, consider increasing dose of  lisinopril if blood pressure is not well-controlled    Thank you for letting us be a part of your care team.  Please call the office if you have further questions or concerns regarding your care    Anne Kevin MD  [id number] FM Resident

## 2024-06-17 ENCOUNTER — APPOINTMENT (OUTPATIENT)
Dept: PRIMARY CARE | Facility: CLINIC | Age: 79
End: 2024-06-17
Payer: COMMERCIAL

## 2024-06-17 ENCOUNTER — HOSPITAL ENCOUNTER (OUTPATIENT)
Dept: RADIOLOGY | Facility: CLINIC | Age: 79
Discharge: HOME | End: 2024-06-17
Payer: COMMERCIAL

## 2024-06-17 ENCOUNTER — TELEPHONE (OUTPATIENT)
Dept: PRIMARY CARE | Facility: CLINIC | Age: 79
End: 2024-06-17

## 2024-06-17 VITALS
DIASTOLIC BLOOD PRESSURE: 70 MMHG | WEIGHT: 246 LBS | BODY MASS INDEX: 32.46 KG/M2 | OXYGEN SATURATION: 97 % | SYSTOLIC BLOOD PRESSURE: 122 MMHG | HEART RATE: 69 BPM

## 2024-06-17 DIAGNOSIS — M79.89 LEG SWELLING: Primary | ICD-10-CM

## 2024-06-17 DIAGNOSIS — I10 ESSENTIAL (PRIMARY) HYPERTENSION: ICD-10-CM

## 2024-06-17 DIAGNOSIS — M79.89 LEG SWELLING: ICD-10-CM

## 2024-06-17 DIAGNOSIS — I48.91 ATRIAL FIBRILLATION, UNSPECIFIED TYPE (MULTI): ICD-10-CM

## 2024-06-17 DIAGNOSIS — I48.20 CHRONIC ATRIAL FIBRILLATION (MULTI): ICD-10-CM

## 2024-06-17 DIAGNOSIS — M71.22 SYNOVIAL CYST OF LEFT POPLITEAL SPACE: ICD-10-CM

## 2024-06-17 PROCEDURE — 3074F SYST BP LT 130 MM HG: CPT | Performed by: STUDENT IN AN ORGANIZED HEALTH CARE EDUCATION/TRAINING PROGRAM

## 2024-06-17 PROCEDURE — 1159F MED LIST DOCD IN RCRD: CPT | Performed by: STUDENT IN AN ORGANIZED HEALTH CARE EDUCATION/TRAINING PROGRAM

## 2024-06-17 PROCEDURE — 99214 OFFICE O/P EST MOD 30 MIN: CPT | Performed by: STUDENT IN AN ORGANIZED HEALTH CARE EDUCATION/TRAINING PROGRAM

## 2024-06-17 PROCEDURE — 93971 EXTREMITY STUDY: CPT

## 2024-06-17 PROCEDURE — 93971 EXTREMITY STUDY: CPT | Performed by: RADIOLOGY

## 2024-06-17 PROCEDURE — 1036F TOBACCO NON-USER: CPT | Performed by: STUDENT IN AN ORGANIZED HEALTH CARE EDUCATION/TRAINING PROGRAM

## 2024-06-17 PROCEDURE — 3078F DIAST BP <80 MM HG: CPT | Performed by: STUDENT IN AN ORGANIZED HEALTH CARE EDUCATION/TRAINING PROGRAM

## 2024-06-17 ASSESSMENT — PATIENT HEALTH QUESTIONNAIRE - PHQ9
1. LITTLE INTEREST OR PLEASURE IN DOING THINGS: NOT AT ALL
SUM OF ALL RESPONSES TO PHQ9 QUESTIONS 1 AND 2: 0
2. FEELING DOWN, DEPRESSED OR HOPELESS: NOT AT ALL

## 2024-06-17 NOTE — RESULT ENCOUNTER NOTE
Ultrasound of the left lower extremity showed no DVT/clots    There was a popliteal fossa fluid collection measuring approximately 7.9 cm  This is nonspecific but most likely a Baker's cyst    This could be causing some of his swelling    This is quite surprising  Ultimately, my medical assistant will place a referral for orthopedic surgery  I would recommend he follows up at his earliest mutual convenience with either Dr. Haque or Matt

## 2024-06-17 NOTE — PROGRESS NOTES
Subjective   Reason for Visit: Sean Khanna is an 78 y.o. male here for a problem visit    HPI  1.  Lower extremity edema  Patient is coming into the office today to discuss unilateral leg swelling  He was following up a couple of times in May due to bilateral lower extremity edema  Was told that this was most likely due to his amlodipine which was ultimately discontinued  His lisinopril was then increased to 30 mg to take alongside his HCTZ 25 mg  His blood pressure at his last visit was well within normal limits as well as he noted stability at home    Ultimately did note some bilateral lower leg cramping and discussed this with his endocrinologist who told him to stay hydrated    He did have lower extremity ultrasounds that were negative  He does have a history of atrial fibrillation and establish with a new cardiologist, Dr. Russell earlier this year and takes Eliquis due to his [id number] 2 score    Patient is coming into the office today due to the swelling occurring again and now wondering if this is due to his medications  Notes that this is all within the left lower extremity  Does notice some minor calf pain but denies any shortness of breath, or any other acute signs/symptoms  Continues to take Eliquis as well as aspirin as noted above and below    Asking to further evaluate/treated        No Known Allergies    Immunization History   Administered Date(s) Administered    Influenza, Seasonal, Quadrivalent, Adjuvanted 10/02/2023    Influenza, seasonal, injectable 10/02/2023    Moderna COVID-19 vaccine, bivalent, blue cap/gray label *Check age/dose* 11/10/2022    Pfizer COVID-19 vaccine, Fall 2023, 12 years and older, (30mcg/0.3mL) 10/05/2023    Pfizer Purple Cap SARS-CoV-2 10/05/2023    Pneumococcal conjugate vaccine, 13-valent (PREVNAR 13) 10/02/2018    Pneumococcal polysaccharide vaccine, 23-valent, age 2 years and older (PNEUMOVAX 23) 12/10/2019    RSV, 60 Years And Older (AREXVY) 10/16/2023    Zoster  vaccine, recombinant, adult (SHINGRIX) 02/08/2024, 05/14/2024       Review of Systems  All pertinent positive symptoms are included in the history of present illness.    All other systems have been reviewed and are negative and noncontributory to this patient's current ailments.    Objective   Vitals:  /70 (BP Location: Left arm, Patient Position: Sitting, BP Cuff Size: Large adult)   Pulse 69   Wt 112 kg (246 lb)   SpO2 97%   BMI 32.46 kg/m²     Lab on 04/25/2024   Component Date Value Ref Range Status    Hemoglobin A1C 04/25/2024 6.6 (H)  see below % Final    Estimated Average Glucose 04/25/2024 143  Not Established mg/dL Final   Lab on 02/21/2024   Component Date Value Ref Range Status    Cholesterol 02/21/2024 104  0 - 199 mg/dL Final          Age      Desirable   Borderline High   High     0-19 Y     0 - 169       170 - 199     >/= 200    20-24 Y     0 - 189       190 - 224     >/= 225         >24 Y     0 - 199       200 - 239     >/= 240   **All ranges are based on fasting samples. Specific   therapeutic targets will vary based on patient-specific   cardiac risk.    Pediatric guidelines reference:Pediatrics 2011, 128(S5).Adult guidelines reference: NCEP ATPIII Guidelines,ABI 2001, 258:2486-97    Venipuncture immediately after or during the administration of Metamizole may lead to falsely low results. Testing should be performed immediately prior to Metamizole dosing.    HDL-Cholesterol 02/21/2024 48.0  mg/dL Final      Age       Very Low   Low     Normal    High    0-19 Y    < 35      < 40     40-45     ----  20-24 Y    ----     < 40      >45      ----        >24 Y      ----     < 40     40-60      >60      Cholesterol/HDL Ratio 02/21/2024 2.2   Final      Ref Values  Desirable  < 3.4  High Risk  > 5.0    LDL Calculated 02/21/2024 43  <=99 mg/dL Final                                Near   Borderline      AGE      Desirable  Optimal    High     High     Very High     0-19 Y     0 - 109     ---     110-129   >/= 130     ----    20-24 Y     0 - 119     ---    120-159   >/= 160     ----      >24 Y     0 -  99   100-129  130-159   160-189     >/=190      VLDL 02/21/2024 13  0 - 40 mg/dL Final    Triglycerides 02/21/2024 67  0 - 149 mg/dL Final       Age         Desirable   Borderline High   High     Very High   0 D-90 D    19 - 174         ----         ----        ----  91 D- 9 Y     0 -  74        75 -  99     >/= 100      ----    10-19 Y     0 -  89        90 - 129     >/= 130      ----    20-24 Y     0 - 114       115 - 149     >/= 150      ----         >24 Y     0 - 149       150 - 199    200- 499    >/= 500    Venipuncture immediately after or during the administration of Metamizole may lead to falsely low results. Testing should be performed immediately prior to Metamizole dosing.    Non HDL Cholesterol 02/21/2024 56  0 - 149 mg/dL Final          Age       Desirable   Borderline High   High     Very High     0-19 Y     0 - 119       120 - 144     >/= 145    >/= 160    20-24 Y     0 - 149       150 - 189     >/= 190      ----         >24 Y    30 mg/dL above LDL Cholesterol goal      Glucose 02/21/2024 91  74 - 99 mg/dL Final    Sodium 02/21/2024 139  136 - 145 mmol/L Final    Potassium 02/21/2024 4.6  3.5 - 5.3 mmol/L Final    Chloride 02/21/2024 103  98 - 107 mmol/L Final    Bicarbonate 02/21/2024 29  21 - 32 mmol/L Final    Anion Gap 02/21/2024 12  10 - 20 mmol/L Final    Urea Nitrogen 02/21/2024 28 (H)  6 - 23 mg/dL Final    Creatinine 02/21/2024 1.28  0.50 - 1.30 mg/dL Final    eGFR 02/21/2024 58 (L)  >60 mL/min/1.73m*2 Final    Calculations of estimated GFR are performed using the 2021 CKD-EPI Study Refit equation without the race variable for the IDMS-Traceable creatinine methods.  https://jasn.asnjournals.org/content/early/2021/09/22/ASN.1295428238    Calcium 02/21/2024 9.2  8.6 - 10.3 mg/dL Final    Albumin 02/21/2024 3.8  3.4 - 5.0 g/dL Final    Alkaline Phosphatase 02/21/2024 83  33 - 136 U/L Final     Total Protein 02/21/2024 7.6  6.4 - 8.2 g/dL Final    AST 02/21/2024 14  9 - 39 U/L Final    Bilirubin, Total 02/21/2024 0.7  0.0 - 1.2 mg/dL Final    ALT 02/21/2024 16  10 - 52 U/L Final    Patients treated with Sulfasalazine may generate falsely decreased results for ALT.    Prostate Specific Antigen,Screen 02/21/2024 2.94  <=4.00 ng/mL Final   Office Visit on 01/18/2024   Component Date Value Ref Range Status    Tissue/Wound Culture/Smear 01/18/2024 (1+) Rare Mixed Skin Microorganisms   Final    Gram Stain 01/18/2024 (1+) Rare Polymorphonuclear leukocytes   Final    Gram Stain 01/18/2024 No organisms seen   Final   Lab on 01/18/2024   Component Date Value Ref Range Status    Hemoglobin A1C 01/18/2024 7.0 (H)  see below % Final    Estimated Average Glucose 01/18/2024 154  Not Established mg/dL Final       Physical Exam    CONSTITUTIONAL - well nourished, well developed, looks like stated age, in no acute distress, not ill-appearing, and not tired appearing  SKIN - normal skin color and pigmentation, normal skin turgor without rash, lesions, or nodules visualized  HEAD - no trauma, normocephalic  EYES - normal external exam  CHEST -no distressed breathing, good effort, heart regular rate and but irregular rhythm, no murmurs, rubs, or gallops, lungs clear to auscultation bilaterally  EXTREMITIES -1+ pitting edema in the right lower extremity, 3+  NEUROLOGICAL - normal balance, normal motor, no ataxia  PSYCHIATRIC - alert, pleasant and cordial, age-appropriate    Assessment/Plan   1.  Hypertension/lower leg extremity edema/atrial fibrillation  Due to this being unilateral, I did order an ultrasound to be again done today to rule out any blood clots  Otherwise, I believe that it is very low chance of you having any issues with your blood pressure medications/side effects  Blood pressure today was well within normal limits  I would recommend you continue both lisinopril 30 mg daily and HCTZ 25 mg daily  I would like  to have you monitor and record blood pressures at home  Blood pressure goal should be below 130/80, ideally 120/80    I provided a requisition for a vascular surgeon, Dr. Pang  Please make appointment at your earliest mutual convenience  Lastly, I would also recommend you follow-up with cardiology    At any point if you notice you have worsening or acute signs/symptoms you need to notify me immediately and/or go to nearest emergency room    Pending your ultrasound, we will make recommendations accordingly      Thank you for letting us be a part of your care team.  Please call the office if you have further questions or concerns regarding your care

## 2024-06-17 NOTE — TELEPHONE ENCOUNTER
----- Message from Faviola Lombardo DO sent at 6/17/2024  1:06 PM EDT -----  Ultrasound of the left lower extremity showed no DVT/clots    There was a popliteal fossa fluid collection measuring approximately 7.9 cm  This is nonspecific but most likely a Baker's cyst    This could be causing some of his swelling    This is quite surprising  Ultimately, my medical assistant will place a referral for orthopedic surgery  I would recommend he follows up at his earliest mutual convenience with either Dr. Haque or Matt

## 2024-06-25 ENCOUNTER — HOSPITAL ENCOUNTER (OUTPATIENT)
Dept: RADIOLOGY | Facility: CLINIC | Age: 79
Discharge: HOME | End: 2024-06-25
Payer: COMMERCIAL

## 2024-06-25 ENCOUNTER — APPOINTMENT (OUTPATIENT)
Dept: ORTHOPEDIC SURGERY | Facility: CLINIC | Age: 79
End: 2024-06-25
Payer: COMMERCIAL

## 2024-06-25 VITALS — HEIGHT: 73 IN | BODY MASS INDEX: 32.6 KG/M2 | WEIGHT: 246 LBS

## 2024-06-25 DIAGNOSIS — M25.562 LEFT KNEE PAIN, UNSPECIFIED CHRONICITY: ICD-10-CM

## 2024-06-25 DIAGNOSIS — M17.12 PRIMARY OSTEOARTHRITIS OF LEFT KNEE: Primary | ICD-10-CM

## 2024-06-25 PROCEDURE — 73564 X-RAY EXAM KNEE 4 OR MORE: CPT | Mod: LT

## 2024-06-25 PROCEDURE — 1159F MED LIST DOCD IN RCRD: CPT | Performed by: ORTHOPAEDIC SURGERY

## 2024-06-25 PROCEDURE — 1125F AMNT PAIN NOTED PAIN PRSNT: CPT | Performed by: ORTHOPAEDIC SURGERY

## 2024-06-25 PROCEDURE — 99204 OFFICE O/P NEW MOD 45 MIN: CPT | Performed by: ORTHOPAEDIC SURGERY

## 2024-06-25 RX ORDER — ASPIRIN 81 MG/1
81 TABLET ORAL DAILY
COMMUNITY

## 2024-06-25 ASSESSMENT — PAIN SCALES - GENERAL: PAINLEVEL_OUTOF10: 6

## 2024-06-25 ASSESSMENT — PAIN - FUNCTIONAL ASSESSMENT: PAIN_FUNCTIONAL_ASSESSMENT: 0-10

## 2024-06-25 NOTE — PROGRESS NOTES
PRIMARY CARE PHYSICIAN: Faviola Lombardo DO  REFERRING PROVIDER: No referring provider defined for this encounter.     CONSULT ORTHOPAEDIC: Knee Evaluation        ASSESSMENT & PLAN    IMPRESSION:   1.  Primary arthritis, left knee, asymptomatic  2.  Left knee Baker's cyst    PLAN:   Discussed the patient findings above.  While he does have some arthritic changes on his x-rays he is relatively asymptomatic from this on his examination.  Most of symptoms are secondary to swelling of his lower extremity.  He does have a fairly large Baker's cyst and probably some contribution of venous insufficiency causing his continued swelling.  I recommended that given that his workup so far has been overall normal that we should have him work with lower extremity compression to help with the swelling.  May potentially consider vascular follow-up in the future if necessary but would like for him to return to his regular activities at this time.  Unfortunately I am unable to aspirate Penny's cyst and discussed that given that his knee is not significantly bothered him do not see any need to proceed with a steroid injection of the knee.  Patient is agreeable to continue conservative care.  Will follow-up as needed.      SUBJECTIVE  CHIEF COMPLAINT:   Chief Complaint   Patient presents with    Left Knee - Pain        HPI: Sean Khanna is a 78 y.o. patient. Sean Khanna has had progressive problems with their left knee over the past 3 weeks. They do not report any trauma. They do not report any constant or progressive numbness or tingling in their legs. Their symptoms are interfering with activities which include pain and swelling in the back of his knee when he stands or walks.  He started with swelling over his whole leg.  He did have an ultrasound to rule out a DVT which was negative, but was told he has a Baker's cyst.      FUNCTIONAL STATUS: not limited.  AMBULATORY STATUS:  independent  PREVIOUS TREATMENTS: NSAIDS Naproxen  as needed with no improvement  HISTORY OF SURGERY ON AFFECTED KNEE(S): No       REVIEW OF SYSTEMS  Constitutional: See HPI for pain assessment, No significant weight loss, recent trauma  Cardiovascular: No chest pain, shortness of breath  Respiratory: No difficulty breathing, cough  Gastrointestinal: No nausea, vomiting, diarrhea, constipation  Musculoskeletal: Noted in HPI, positive for pain, restricted motion, stiffness  Integumentary: No rashes, easy bruising, redness   Neurological: no numbness or tingling in extremities, no gait disturbances   Psychiatric: No mood changes, memory changes, social issues  Heme/Lymph: no excessive swelling, easy bruising, excessive bleeding  ENT: No hearing changes  Eyes: No vision changes    Past Medical History:   Diagnosis Date    Coronary artery disease involving native coronary artery of native heart without angina pectoris 03/05/2024    Angina s/p PCI 2012 - records N/A    Longstanding persistent atrial fibrillation (Multi) 03/05/2024    PAF (paroxysmal atrial fibrillation) (Multi) 03/05/2024        No Known Allergies     Past Surgical History:   Procedure Laterality Date    OTHER SURGICAL HISTORY  11/18/2022    Hernia repair        Family History   Problem Relation Name Age of Onset    Hypertension Mother      Hyperlipidemia Mother      Heart attack Mother      Hypertension Father      Hyperlipidemia Father      Heart attack Father          Social History     Socioeconomic History    Marital status: Unknown     Spouse name: Not on file    Number of children: Not on file    Years of education: Not on file    Highest education level: Not on file   Occupational History    Not on file   Tobacco Use    Smoking status: Never    Smokeless tobacco: Never   Substance and Sexual Activity    Alcohol use: Never    Drug use: Never    Sexual activity: Not on file   Other Topics Concern    Not on file   Social History Narrative    Not on file     Social Determinants of Health  "    Financial Resource Strain: Not on file   Food Insecurity: Not on file   Transportation Needs: Not on file   Physical Activity: Not on file   Stress: Not on file   Social Connections: Not on file   Intimate Partner Violence: Not on file   Housing Stability: Not on file        CURRENT MEDICATIONS:   Current Outpatient Medications   Medication Sig Dispense Refill    aspirin 81 mg EC tablet Take 1 tablet (81 mg) by mouth once daily.      Eliquis 5 mg tablet Take 1 tablet (5 mg) by mouth 2 times a day.      HumaLOG Mix 75-25 KwikPen 100 unit/mL (75-25) injection Inject 30 Units under the skin 2 times a day with meals. 60 mL 3    Jardiance 25 mg Take 1 tablet (25 mg) by mouth once daily. 30 tablet 11    ketoconazole (NIZOral) 2 % shampoo Apply topically 3 (three) times a week. 120 mL 11    ketoconazole-iodoquinoL-hc 2-1-2.5 % cream Apply a thin layer to the affect area in the morning and at night 30 g 2    lisinopril 10 mg tablet Take 1 tablet (10 mg) by mouth once daily. 90 tablet 1    lisinopriL-hydrochlorothiazide 20-25 mg tablet Take 1 tablet by mouth once daily. 90 tablet 1    metFORMIN (Glucophage) 850 mg tablet Take 1 tablet (850 mg) by mouth 2 times a day. 180 tablet 3    OneTouch Delica Plus Lancet 30 gauge misc FOR GLUCOSE TESTING 3 TIMES DAILY      OneTouch Verio Flex meter misc FOR GLUCOSE TESTING 3 TIMES DAILY      OneTouch Verio test strips strip TEST 3 TIMES DAILY.      pen needle, diabetic (BD Gina 2nd Gen Pen Needle) 32 gauge x 5/32\" needle USE AS DIRECTED 300 each 1    simvastatin (Zocor) 40 mg tablet Take 1 tablet (40 mg) by mouth once daily at bedtime. 90 tablet 1     No current facility-administered medications for this visit.        OBJECTIVE    PHYSICAL EXAM      2/13/2024    10:01 AM 3/5/2024     9:07 AM 5/2/2024     9:45 AM 5/8/2024    12:58 PM 5/14/2024     1:46 PM 6/17/2024    11:12 AM 6/25/2024    10:40 AM   Vitals   Systolic 118 124 149 130 120 122    Diastolic 68 61 67 64 70 70    Heart " "Rate 58 57 57 70 55 69    Height (in) 1.803 m (5' 11\") 1.854 m (6' 1\")     1.854 m (6' 1\")   Weight (lb) 233 241.4 242.51   246 246   BMI 32.5 kg/m2 31.85 kg/m2 31.99 kg/m2   32.46 kg/m2 32.46 kg/m2   BSA (m2) 2.3 m2 2.37 m2 2.38 m2   2.4 m2 2.4 m2   Visit Report Report Report Report Report Report Report Report      Body mass index is 32.46 kg/m².    GENERAL: A/Ox3, NAD. Appears healthy, well nourished  PSYCHIATRIC: Mood stable, appropriate memory recall  EYES: EOM intact, no scleral icterus  CARDIAC: regular rate  LUNGS: Breathing non-labored  SKIN: no erythema, rashes, or ecchymoses     MUSCULOSKELETAL:  Laterality: left Knee Exam  - Alignment: neutral  - ROM:  5 - 120 deg  - Effusion: none  - Strength: knee extension and flexion 5/5, EHL/PF/DF motor intact. Has pain in cafe with passive dorsiflexion while knee is straight  - Palpation: TTP along posteromedial calf likely 2/2 bakers cyst  - Stability: Anterior/Posterior stable, varus/valgus stable  - Gait: normal  - Hip Exam: flexion to 100+ degrees, full extension, internal/external rotation adequate, and no pain with log roll  - Special Tests: none      NEUROVASCULAR:  - Neurovascular Status: sensation intact to light touch distally  - Capillary refill brisk at extremities, Bilateral dorsalis pedis pulse 2+     IMAGING:  Multiple views of the affected left knee(s) demonstrate: Moderate tricompartmental osteoarthritic changes with joint space narrowing and subchondral sclerosis.   X-rays were personally reviewed and interpreted by me.  Radiology reports were reviewed by me as well, if readily available at the time.        Joe Haque DO  Attending Surgeon  Joint Replacement and Adult Reconstructive Surgery  [place], [place]                         "

## 2024-06-25 NOTE — LETTER
June 25, 2024     Faviola Lombardo DO  [address]  [facility], [address]  [facility] [address]    Patient: TUNDE Khanna   YOB: 1945   Date of Visit: 6/25/2024       Dear Dr. Faviola Lombardo DO:    Thank you for referring TUNDE Khanna to me for evaluation. Below are my notes for this consultation.  If you have questions, please do not hesitate to call me. I look forward to following your patient along with you.       Sincerely,     Joe Haque DO      CC: No Recipients  ______________________________________________________________________________________    PRIMARY CARE PHYSICIAN: Faviola Lombardo DO  REFERRING PROVIDER: No referring provider defined for this encounter.     CONSULT ORTHOPAEDIC: Knee Evaluation        ASSESSMENT & PLAN    IMPRESSION:   1.  Primary arthritis, left knee, asymptomatic  2.  Left knee Baker's cyst    PLAN:   Discussed the patient findings above.  While he does have some arthritic changes on his x-rays he is relatively asymptomatic from this on his examination.  Most of symptoms are secondary to swelling of his lower extremity.  He does have a fairly large Baker's cyst and probably some contribution of venous insufficiency causing his continued swelling.  I recommended that given that his workup so far has been overall normal that we should have him work with lower extremity compression to help with the swelling.  May potentially consider vascular follow-up in the future if necessary but would like for him to return to his regular activities at this time.  Unfortunately I am unable to aspirate Penny's cyst and discussed that given that his knee is not significantly bothered him do not see any need to proceed with a steroid injection of the knee.  Patient is agreeable to continue conservative care.  Will follow-up as needed.      SUBJECTIVE  CHIEF COMPLAINT:   Chief Complaint   Patient presents with   • Left Knee - Pain        HPI: Sean Khanna is a 78  y.o. patient. Sean Khanna has had progressive problems with their left knee over the past 3 weeks. They do not report any trauma. They do not report any constant or progressive numbness or tingling in their legs. Their symptoms are interfering with activities which include pain and swelling in the back of his knee when he stands or walks.  He started with swelling over his whole leg.  He did have an ultrasound to rule out a DVT which was negative, but was told he has a Baker's cyst.      FUNCTIONAL STATUS: not limited.  AMBULATORY STATUS:  independent  PREVIOUS TREATMENTS: NSAIDS Naproxen as needed with no improvement  HISTORY OF SURGERY ON AFFECTED KNEE(S): No       REVIEW OF SYSTEMS  Constitutional: See HPI for pain assessment, No significant weight loss, recent trauma  Cardiovascular: No chest pain, shortness of breath  Respiratory: No difficulty breathing, cough  Gastrointestinal: No nausea, vomiting, diarrhea, constipation  Musculoskeletal: Noted in HPI, positive for pain, restricted motion, stiffness  Integumentary: No rashes, easy bruising, redness   Neurological: no numbness or tingling in extremities, no gait disturbances   Psychiatric: No mood changes, memory changes, social issues  Heme/Lymph: no excessive swelling, easy bruising, excessive bleeding  ENT: No hearing changes  Eyes: No vision changes    Past Medical History:   Diagnosis Date   • Coronary artery disease involving native coronary artery of native heart without angina pectoris 03/05/2024    Angina s/p PCI 2012 - records N/A   • Longstanding persistent atrial fibrillation (Multi) 03/05/2024   • PAF (paroxysmal atrial fibrillation) (Multi) 03/05/2024        No Known Allergies     Past Surgical History:   Procedure Laterality Date   • OTHER SURGICAL HISTORY  11/18/2022    Hernia repair        Family History   Problem Relation Name Age of Onset   • Hypertension Mother     • Hyperlipidemia Mother     • Heart attack Mother     • Hypertension  Father     • Hyperlipidemia Father     • Heart attack Father          Social History     Socioeconomic History   • Marital status: Unknown     Spouse name: Not on file   • Number of children: Not on file   • Years of education: Not on file   • Highest education level: Not on file   Occupational History   • Not on file   Tobacco Use   • Smoking status: Never   • Smokeless tobacco: Never   Substance and Sexual Activity   • Alcohol use: Never   • Drug use: Never   • Sexual activity: Not on file   Other Topics Concern   • Not on file   Social History Narrative   • Not on file     Social Determinants of Health     Financial Resource Strain: Not on file   Food Insecurity: Not on file   Transportation Needs: Not on file   Physical Activity: Not on file   Stress: Not on file   Social Connections: Not on file   Intimate Partner Violence: Not on file   Housing Stability: Not on file        CURRENT MEDICATIONS:   Current Outpatient Medications   Medication Sig Dispense Refill   • aspirin 81 mg EC tablet Take 1 tablet (81 mg) by mouth once daily.     • Eliquis 5 mg tablet Take 1 tablet (5 mg) by mouth 2 times a day.     • HumaLOG Mix 75-25 KwikPen 100 unit/mL (75-25) injection Inject 30 Units under the skin 2 times a day with meals. 60 mL 3   • Jardiance 25 mg Take 1 tablet (25 mg) by mouth once daily. 30 tablet 11   • ketoconazole (NIZOral) 2 % shampoo Apply topically 3 (three) times a week. 120 mL 11   • ketoconazole-iodoquinoL-hc 2-1-2.5 % cream Apply a thin layer to the affect area in the morning and at night 30 g 2   • lisinopril 10 mg tablet Take 1 tablet (10 mg) by mouth once daily. 90 tablet 1   • lisinopriL-hydrochlorothiazide 20-25 mg tablet Take 1 tablet by mouth once daily. 90 tablet 1   • metFORMIN (Glucophage) 850 mg tablet Take 1 tablet (850 mg) by mouth 2 times a day. 180 tablet 3   • OneTouch Delica Plus Lancet 30 gauge misc FOR GLUCOSE TESTING 3 TIMES DAILY     • OneTouch Verio Flex meter misc FOR GLUCOSE  "TESTING 3 TIMES DAILY     • OneTouch Verio test strips strip TEST 3 TIMES DAILY.     • pen needle, diabetic (BD Gina 2nd Gen Pen Needle) 32 gauge x 5/32\" needle USE AS DIRECTED 300 each 1   • simvastatin (Zocor) 40 mg tablet Take 1 tablet (40 mg) by mouth once daily at bedtime. 90 tablet 1     No current facility-administered medications for this visit.        OBJECTIVE    PHYSICAL EXAM      2/13/2024    10:01 AM 3/5/2024     9:07 AM 5/2/2024     9:45 AM 5/8/2024    12:58 PM 5/14/2024     1:46 PM 6/17/2024    11:12 AM 6/25/2024    10:40 AM   Vitals   Systolic 118 124 149 130 120 122    Diastolic 68 61 67 64 70 70    Heart Rate 58 57 57 70 55 69    Height (in) 1.803 m (5' 11\") 1.854 m (6' 1\")     1.854 m (6' 1\")   Weight (lb) 233 241.4 242.51   246 246   BMI 32.5 kg/m2 31.85 kg/m2 31.99 kg/m2   32.46 kg/m2 32.46 kg/m2   BSA (m2) 2.3 m2 2.37 m2 2.38 m2   2.4 m2 2.4 m2   Visit Report Report Report Report Report Report Report Report      Body mass index is 32.46 kg/m².    GENERAL: A/Ox3, NAD. Appears healthy, well nourished  PSYCHIATRIC: Mood stable, appropriate memory recall  EYES: EOM intact, no scleral icterus  CARDIAC: regular rate  LUNGS: Breathing non-labored  SKIN: no erythema, rashes, or ecchymoses     MUSCULOSKELETAL:  Laterality: left Knee Exam  - Alignment: neutral  - ROM:  5 - 120 deg  - Effusion: none  - Strength: knee extension and flexion 5/5, EHL/PF/DF motor intact. Has pain in cafe with passive dorsiflexion while knee is straight  - Palpation: TTP along posteromedial calf likely 2/2 bakers cyst  - Stability: Anterior/Posterior stable, varus/valgus stable  - Gait: normal  - Hip Exam: flexion to 100+ degrees, full extension, internal/external rotation adequate, and no pain with log roll  - Special Tests: none      NEUROVASCULAR:  - Neurovascular Status: sensation intact to light touch distally  - Capillary refill brisk at extremities, Bilateral dorsalis pedis pulse 2+     IMAGING:  Multiple views of the " affected left knee(s) demonstrate: Moderate tricompartmental osteoarthritic changes with joint space narrowing and subchondral sclerosis.   X-rays were personally reviewed and interpreted by me.  Radiology reports were reviewed by me as well, if readily available at the time.        Joe Haque DO  Attending Surgeon  [facility] and Adult Reconstructive Surgery  [place], [place]

## 2024-08-21 ENCOUNTER — LAB (OUTPATIENT)
Dept: LAB | Facility: LAB | Age: 79
End: 2024-08-21
Payer: COMMERCIAL

## 2024-08-21 ENCOUNTER — APPOINTMENT (OUTPATIENT)
Dept: PRIMARY CARE | Facility: CLINIC | Age: 79
End: 2024-08-21
Payer: COMMERCIAL

## 2024-08-21 VITALS
OXYGEN SATURATION: 96 % | HEIGHT: 73 IN | DIASTOLIC BLOOD PRESSURE: 66 MMHG | HEART RATE: 67 BPM | BODY MASS INDEX: 31.68 KG/M2 | SYSTOLIC BLOOD PRESSURE: 114 MMHG | WEIGHT: 239 LBS

## 2024-08-21 DIAGNOSIS — I48.20 CHRONIC ATRIAL FIBRILLATION (MULTI): ICD-10-CM

## 2024-08-21 DIAGNOSIS — Z79.4 TYPE 2 DIABETES MELLITUS WITHOUT COMPLICATION, WITH LONG-TERM CURRENT USE OF INSULIN (MULTI): ICD-10-CM

## 2024-08-21 DIAGNOSIS — I48.91 ATRIAL FIBRILLATION, UNSPECIFIED TYPE (MULTI): Primary | ICD-10-CM

## 2024-08-21 DIAGNOSIS — R05.1 ACUTE COUGH: ICD-10-CM

## 2024-08-21 DIAGNOSIS — N18.30 STAGE 3 CHRONIC KIDNEY DISEASE, UNSPECIFIED WHETHER STAGE 3A OR 3B CKD (MULTI): ICD-10-CM

## 2024-08-21 DIAGNOSIS — E78.2 MIXED HYPERLIPIDEMIA: ICD-10-CM

## 2024-08-21 DIAGNOSIS — I10 PRIMARY HYPERTENSION: ICD-10-CM

## 2024-08-21 DIAGNOSIS — E11.9 TYPE 2 DIABETES MELLITUS WITHOUT COMPLICATION, WITH LONG-TERM CURRENT USE OF INSULIN (MULTI): ICD-10-CM

## 2024-08-21 DIAGNOSIS — I48.91 ATRIAL FIBRILLATION, UNSPECIFIED TYPE (MULTI): ICD-10-CM

## 2024-08-21 DIAGNOSIS — I10 ESSENTIAL (PRIMARY) HYPERTENSION: ICD-10-CM

## 2024-08-21 LAB
ALBUMIN SERPL BCP-MCNC: 3.9 G/DL (ref 3.4–5)
ALP SERPL-CCNC: 77 U/L (ref 33–136)
ALT SERPL W P-5'-P-CCNC: 15 U/L (ref 10–52)
ANION GAP SERPL CALC-SCNC: 15 MMOL/L (ref 10–20)
AST SERPL W P-5'-P-CCNC: 14 U/L (ref 9–39)
BILIRUB SERPL-MCNC: 0.6 MG/DL (ref 0–1.2)
BUN SERPL-MCNC: 29 MG/DL (ref 6–23)
CALCIUM SERPL-MCNC: 9.2 MG/DL (ref 8.6–10.3)
CHLORIDE SERPL-SCNC: 102 MMOL/L (ref 98–107)
CHOLEST SERPL-MCNC: 104 MG/DL (ref 0–199)
CHOLESTEROL/HDL RATIO: 2.2
CO2 SERPL-SCNC: 28 MMOL/L (ref 21–32)
CREAT SERPL-MCNC: 1.48 MG/DL (ref 0.5–1.3)
EGFRCR SERPLBLD CKD-EPI 2021: 48 ML/MIN/1.73M*2
EST. AVERAGE GLUCOSE BLD GHB EST-MCNC: 154 MG/DL
GLUCOSE SERPL-MCNC: 104 MG/DL (ref 74–99)
HBA1C MFR BLD: 7 %
HDLC SERPL-MCNC: 46.4 MG/DL
LDLC SERPL CALC-MCNC: 43 MG/DL
NON HDL CHOLESTEROL: 58 MG/DL (ref 0–149)
POTASSIUM SERPL-SCNC: 4.7 MMOL/L (ref 3.5–5.3)
PROT SERPL-MCNC: 7.6 G/DL (ref 6.4–8.2)
SODIUM SERPL-SCNC: 140 MMOL/L (ref 136–145)
TRIGL SERPL-MCNC: 75 MG/DL (ref 0–149)
VLDL: 15 MG/DL (ref 0–40)

## 2024-08-21 PROCEDURE — 80061 LIPID PANEL: CPT

## 2024-08-21 PROCEDURE — 3078F DIAST BP <80 MM HG: CPT | Performed by: STUDENT IN AN ORGANIZED HEALTH CARE EDUCATION/TRAINING PROGRAM

## 2024-08-21 PROCEDURE — 3074F SYST BP LT 130 MM HG: CPT | Performed by: STUDENT IN AN ORGANIZED HEALTH CARE EDUCATION/TRAINING PROGRAM

## 2024-08-21 PROCEDURE — 1036F TOBACCO NON-USER: CPT | Performed by: STUDENT IN AN ORGANIZED HEALTH CARE EDUCATION/TRAINING PROGRAM

## 2024-08-21 PROCEDURE — 83036 HEMOGLOBIN GLYCOSYLATED A1C: CPT

## 2024-08-21 PROCEDURE — 80053 COMPREHEN METABOLIC PANEL: CPT

## 2024-08-21 PROCEDURE — 99214 OFFICE O/P EST MOD 30 MIN: CPT | Performed by: STUDENT IN AN ORGANIZED HEALTH CARE EDUCATION/TRAINING PROGRAM

## 2024-08-21 PROCEDURE — 36415 COLL VENOUS BLD VENIPUNCTURE: CPT

## 2024-08-21 PROCEDURE — 1159F MED LIST DOCD IN RCRD: CPT | Performed by: STUDENT IN AN ORGANIZED HEALTH CARE EDUCATION/TRAINING PROGRAM

## 2024-08-21 PROCEDURE — G2211 COMPLEX E/M VISIT ADD ON: HCPCS | Performed by: STUDENT IN AN ORGANIZED HEALTH CARE EDUCATION/TRAINING PROGRAM

## 2024-08-21 RX ORDER — SIMVASTATIN 40 MG/1
40 TABLET, FILM COATED ORAL NIGHTLY
Qty: 90 TABLET | Refills: 1 | Status: SHIPPED | OUTPATIENT
Start: 2024-08-21

## 2024-08-21 RX ORDER — LISINOPRIL 10 MG/1
10 TABLET ORAL DAILY
Qty: 90 TABLET | Refills: 1 | Status: SHIPPED | OUTPATIENT
Start: 2024-08-21

## 2024-08-21 RX ORDER — LISINOPRIL AND HYDROCHLOROTHIAZIDE 20; 25 MG/1; MG/1
1 TABLET ORAL DAILY
Qty: 90 TABLET | Refills: 1 | Status: SHIPPED | OUTPATIENT
Start: 2024-08-21

## 2024-08-21 ASSESSMENT — PATIENT HEALTH QUESTIONNAIRE - PHQ9
SUM OF ALL RESPONSES TO PHQ9 QUESTIONS 1 AND 2: 0
2. FEELING DOWN, DEPRESSED OR HOPELESS: NOT AT ALL
1. LITTLE INTEREST OR PLEASURE IN DOING THINGS: NOT AT ALL

## 2024-08-21 NOTE — PROGRESS NOTES
"Subjective   Patient ID: Sean Khanna \"ED\" is a 78 y.o. male who presents for Diabetes, Atrial Fibrillation, Hyperlipidemia, and Hypertension.  Today he is accompanied by alone.     HPI  1. Diabetes Mellitus Type II/CKD  Takes Jardiance 25 mg, Metformin 850 twice a day, and humalog   Monitors blood sugars at home, which are usually ~126  Follows with Dr. Novak in endocrinology to manage his diabetes  Last GFR was 58  Denies polyuria, polydipsia, numbness or tingling      2. Atrial Fibrillation  Takes metoprolol 12.5 mg twice a day   Following with Dr. Russell   Denies chest pain, chest pressure, heart palpitations or any other acute cardiopulmonary symptoms      3. Hyperlipidemia   Takes Simvastatin 40 mg   Denies chest pain, chest pressure, shortness of breath.  Lab work from 2/21/24 Cholesterol 104 HDL 48 LDL 43 TG 67     4. Hypertension   Takes Metoprolol 12.5 mg twice a day, lisinopril 10 mg, and HCTZ 25 mg   Blood pressure is stable in office at 114/66  Does not typically take blood pressure at home  Denies chest pain, lightheadedness, dizziness or any other acute cardiopulmonary symptoms      5.  Right shoulder OA  Has a longstanding history of right-sided shoulder pain, which has since completely resolved since reducing his exercise  Imaging done on 10/16/23 showed mild osteoarthritis of the right AC joint   He is not interested in taking medication or receiving steroid shot  Currently is slowly beginning to exercise again and will monitor if pain starts up again     6.  Dry Cough   Patient has been having dry cough lately but very rare in occurrence   He says he thinks it is likely due to Lisinopril       Health Maintenance Updates   Immunization: Tdap- needs to be updated   Shingrix- 2/8/24; PCV13 - up to date PPSV23- up to date   RSV- 10/16/23  COVID-19 vaccine: x 5 doses  Colon Cancer Screening: Colonoscopy done 2-3 years ago, no polyps found. He declines future colonoscopies  Diet: well " "balanced  Exercise: working on it   Tobacco: former smoker, quit 30 years ago   EtOH: few times a month a month socially       Current Outpatient Medications on File Prior to Visit   Medication Sig Dispense Refill    aspirin 81 mg EC tablet Take 1 tablet (81 mg) by mouth once daily.      Eliquis 5 mg tablet Take 1 tablet (5 mg) by mouth 2 times a day.      HumaLOG Mix 75-25 KwikPen 100 unit/mL (75-25) injection Inject 30 Units under the skin 2 times a day with meals. 60 mL 3    Jardiance 25 mg Take 1 tablet (25 mg) by mouth once daily. 30 tablet 11    ketoconazole (NIZOral) 2 % shampoo Apply topically 3 (three) times a week. 120 mL 11    ketoconazole-iodoquinoL-hc 2-1-2.5 % cream Apply a thin layer to the affect area in the morning and at night 30 g 2    metFORMIN (Glucophage) 850 mg tablet Take 1 tablet (850 mg) by mouth 2 times a day. 180 tablet 3    OneTouch Delica Plus Lancet 30 gauge misc FOR GLUCOSE TESTING 3 TIMES DAILY      OneTouch Verio Flex meter misc FOR GLUCOSE TESTING 3 TIMES DAILY      OneTouch Verio test strips strip TEST 3 TIMES DAILY.      pen needle, diabetic (BD Gina 2nd Gen Pen Needle) 32 gauge x 5/32\" needle USE AS DIRECTED 300 each 1    [DISCONTINUED] lisinopril 10 mg tablet Take 1 tablet (10 mg) by mouth once daily. 90 tablet 1    [DISCONTINUED] lisinopriL-hydrochlorothiazide 20-25 mg tablet Take 1 tablet by mouth once daily. 90 tablet 1    [DISCONTINUED] simvastatin (Zocor) 40 mg tablet Take 1 tablet (40 mg) by mouth once daily at bedtime. 90 tablet 1     No current facility-administered medications on file prior to visit.        No Known Allergies    Immunization History   Administered Date(s) Administered    Influenza, Seasonal, Quadrivalent, Adjuvanted 10/02/2023    Influenza, seasonal, injectable 10/02/2023    Moderna COVID-19 vaccine, bivalent, blue cap/gray label *Check age/dose* 11/10/2022    Moderna SARS-CoV-2 Vaccination 02/22/2021, 03/22/2021, 11/16/2021    Pfizer COVID-19 vaccine, " "Fall 2023, 12 years and older, (30mcg/0.3mL) 10/05/2023    Pfizer Purple Cap SARS-CoV-2 10/05/2023    Pneumococcal Conjugate PCV 7 1945    Pneumococcal conjugate vaccine, 13-valent (PREVNAR 13) 10/02/2018    Pneumococcal polysaccharide vaccine, 23-valent, age 2 years and older (PNEUMOVAX 23) 12/10/2019    RSV, 60 Years And Older (AREXVY) 10/16/2023    Zoster vaccine, recombinant, adult (SHINGRIX) 02/08/2024, 05/14/2024         Review of Systems  All pertinent positive symptoms are included in the history of present illness.  All other systems have been reviewed and are negative and noncontributory to this patient's current ailments.     Objective   /66 (BP Location: Left arm, Patient Position: Sitting, BP Cuff Size: Large adult)   Pulse 67   Ht 1.854 m (6' 1\")   Wt 108 kg (239 lb)   SpO2 96%   BMI 31.53 kg/m²   BSA: 2.36 meters squared  No visits with results within 1 Month(s) from this visit.   Latest known visit with results is:   Lab on 04/25/2024   Component Date Value Ref Range Status    Hemoglobin A1C 04/25/2024 6.6 (H)  see below % Final    Estimated Average Glucose 04/25/2024 143  Not Established mg/dL Final       Physical Exam  CONSTITUTIONAL - well nourished, well developed, looks like stated age, in no acute distress, not ill-appearing, and not tired appearing  SKIN - normal skin color and pigmentation, normal skin turgor without rash, lesions, or nodules visualized  HEAD - no trauma, normocephalic  EYES - normal external exam  CHEST -no distressed breathing, good effort, heart regular rate and but irregular rhythm, no murmurs, rubs, or gallops, lungs clear to auscultation bilaterally   EXTREMITIES - no edema, no deformities  NEUROLOGICAL - normal balance, normal motor, no ataxia  PSYCHIATRIC - alert, pleasant and cordial, age-appropriate    Assessment/Plan   1. Diabetes Mellitus Type II/CKD  Continue Jardiance 25 mg, Metformin 850 twice a day, and humalog  Continue to follow up with " endocrinologist  Most recent hemoglobin A1c was at 6.6% which is a great improvement, keep up the great work  GFR at your last visit was at 58%  We will continue monitoring closely so please stay hydrated at this time     2. Atrial Fibrillation  Continue Metoprolol 12.5 mg twice a day   Continue following with Dr. Russell   I would like to have you monitor and record blood pressures at home   Blood pressure goal should be below 130/80, ideally 120/80  Given your history of A-Fib, please go to the ED immediately if you begin to experience chest pain, palpitations, excessive sweating, difficulty breathing, shortness of breath, facial numbness/weakness, vision disturbances, significant arm or leg numbness/weakness, or severe headache      3. Hyperlipidemia   Continue Simvastatin 40 mg   I have provided you with a requisition for a lipid panel   I will notify you of the results and make treatment recommendations accordingly      4. Hypertension   Continue Metoprolol 12.5 mg twice a day, Lisinopril 30 mg, and hydrochlorothiazide 25 mg daily   I would like to have you monitor and record blood pressures at home   Blood pressure goal should be below 130/80, ideally 120/80     5  Right shoulder pain  Continue monitoring signs/symptoms      6. Dry Cough   Continue monitoring signs/symptoms     If you have any questions/concerns, please contact our office.   Otherwise, we will see you at your next visit

## 2024-08-22 NOTE — RESULT ENCOUNTER NOTE
Sugar slightly elevated 104 but otherwise liver and electrolytes within normal limits    Kidney function slightly decreased once again at 48% and also BUN/creatinine elevated at 29 and 1.48 respectively  This is pointing towards some dehydration so I would recommend increased water intake at this time    Hemoglobin A1c did increase slightly up to 7.0% so please continue following up with your endocrinologist    Cholesterol continues to look fantastic at 104, HDL 46, LDL 43, triglyceride 75

## 2024-09-05 ENCOUNTER — APPOINTMENT (OUTPATIENT)
Dept: ENDOCRINOLOGY | Facility: CLINIC | Age: 79
End: 2024-09-05
Payer: COMMERCIAL

## 2024-09-05 VITALS
BODY MASS INDEX: 31.41 KG/M2 | DIASTOLIC BLOOD PRESSURE: 83 MMHG | HEART RATE: 61 BPM | WEIGHT: 238.1 LBS | SYSTOLIC BLOOD PRESSURE: 170 MMHG

## 2024-09-05 DIAGNOSIS — E11.9 TYPE 2 DIABETES MELLITUS WITHOUT COMPLICATION, WITH LONG-TERM CURRENT USE OF INSULIN (MULTI): ICD-10-CM

## 2024-09-05 DIAGNOSIS — Z79.4 TYPE 2 DIABETES MELLITUS WITHOUT COMPLICATION, WITH LONG-TERM CURRENT USE OF INSULIN (MULTI): ICD-10-CM

## 2024-09-05 PROCEDURE — 3079F DIAST BP 80-89 MM HG: CPT | Performed by: INTERNAL MEDICINE

## 2024-09-05 PROCEDURE — 3077F SYST BP >= 140 MM HG: CPT | Performed by: INTERNAL MEDICINE

## 2024-09-05 PROCEDURE — 1160F RVW MEDS BY RX/DR IN RCRD: CPT | Performed by: INTERNAL MEDICINE

## 2024-09-05 PROCEDURE — 99214 OFFICE O/P EST MOD 30 MIN: CPT | Performed by: INTERNAL MEDICINE

## 2024-09-05 PROCEDURE — 1159F MED LIST DOCD IN RCRD: CPT | Performed by: INTERNAL MEDICINE

## 2024-09-05 PROCEDURE — 1036F TOBACCO NON-USER: CPT | Performed by: INTERNAL MEDICINE

## 2024-09-05 RX ORDER — INSULIN LISPRO 100 [IU]/ML
28 INJECTION, SUSPENSION SUBCUTANEOUS
Start: 2024-09-05

## 2024-09-05 ASSESSMENT — ENCOUNTER SYMPTOMS
NAUSEA: 0
HEADACHES: 0
ABDOMINAL PAIN: 0
FREQUENCY: 0
NERVOUS/ANXIOUS: 0
FEVER: 0
ARTHRALGIAS: 0
CONSTIPATION: 0
POLYDIPSIA: 0
APPETITE CHANGE: 0
SORE THROAT: 0
DIARRHEA: 0
VOMITING: 0
COUGH: 0
SHORTNESS OF BREATH: 0

## 2024-09-05 NOTE — PATIENT INSTRUCTIONS
RECOMMENDATIONS  Decrease Humalog Mix 75/25 to 28 units twice daily      Follow up 6 months  Repeat A1c before next appointment if not already done.

## 2024-09-05 NOTE — PROGRESS NOTES
"History Of Present Illness  Sean Khanna \"ED\" is a 78 y.o. male     Duration of type 2 diabetes mellitus:  6 years  Complications:  Coronary artery disease     Interval history of left Baker's cyst  Started compression stocking    Humalog Mix 75/25  Breakfast 30 units  Dinner 30 units     Jardiance 25 mg/day  Metformin 850 mg BID     FreeStyle Neyda 2  Patient is testing glucose 288 times daily  Records reviewed, on file     Last eye exam:  July 2024     He is still having overnight hypoglycemia     Last eye exam:  March 2024    Past Medical History  He has a past medical history of Coronary artery disease involving native coronary artery of native heart without angina pectoris (03/05/2024), Longstanding persistent atrial fibrillation (Multi) (03/05/2024), and PAF (paroxysmal atrial fibrillation) (Multi) (03/05/2024).    Surgical History  He has a past surgical history that includes Other surgical history (11/18/2022).     Social History  He reports that he has never smoked. He has never used smokeless tobacco. He reports that he does not drink alcohol and does not use drugs.    Family History  Family History   Problem Relation Name Age of Onset    Hypertension Mother      Hyperlipidemia Mother      Heart attack Mother      Hypertension Father      Hyperlipidemia Father      Heart attack Father         Medications  Current Outpatient Medications   Medication Instructions    aspirin 81 mg, oral, Daily    Eliquis 5 mg, oral, 2 times daily    HumaLOG Mix 75-25 KwikPen 100 unit/mL (75-25) injection 30 Units, subcutaneous, 2 times daily (morning and late afternoon)    Jardiance 25 mg, oral, Daily    ketoconazole (NIZOral) 2 % shampoo Topical, 3 times weekly    ketoconazole-iodoquinoL-hc 2-1-2.5 % cream Apply a thin layer to the affect area in the morning and at night    lisinopriL-hydrochlorothiazide 20-25 mg tablet 1 tablet, oral, Daily    lisinopril 10 mg, oral, Daily    metFORMIN (GLUCOPHAGE) 850 mg, oral, 2 times " "daily    OneTouch Delica Plus Lancet 30 gauge misc FOR GLUCOSE TESTING 3 TIMES DAILY    OneTouch Verio Flex meter misc FOR GLUCOSE TESTING 3 TIMES DAILY    OneTouch Verio test strips strip TEST 3 TIMES DAILY.    pen needle, diabetic (BD Gina 2nd Gen Pen Needle) 32 gauge x 5/32\" needle USE AS DIRECTED    simvastatin (ZOCOR) 40 mg, oral, Nightly       Allergies  Patient has no known allergies.    Review of Systems   Constitutional:  Negative for appetite change and fever.   HENT:  Negative for sore throat.         Denies dry mouth   Eyes:  Negative for visual disturbance.   Respiratory:  Negative for cough and shortness of breath.    Cardiovascular:  Negative for chest pain.   Gastrointestinal:  Negative for abdominal pain, constipation, diarrhea, nausea and vomiting.   Endocrine: Negative for polydipsia and polyuria.   Genitourinary:  Negative for frequency.   Musculoskeletal:  Negative for arthralgias.   Skin:  Negative for rash.   Neurological:  Negative for headaches.   Psychiatric/Behavioral:  The patient is not nervous/anxious.          Last Recorded Vitals  Blood pressure 170/83, pulse 61, weight 108 kg (238 lb 1.6 oz).    Physical Exam  Constitutional:       General: He is not in acute distress.  HENT:      Head: Normocephalic.      Mouth/Throat:      Mouth: Mucous membranes are moist.   Eyes:      Extraocular Movements: Extraocular movements intact.   Neck:      Thyroid: No thyroid mass or thyromegaly.   Cardiovascular:      Pulses:           Radial pulses are 2+ on the right side and 2+ on the left side.   Musculoskeletal:      Right lower leg: No edema.      Left lower leg: No edema.   Lymphadenopathy:      Cervical: No cervical adenopathy.   Neurological:      Mental Status: He is alert.      Motor: No tremor.   Psychiatric:         Mood and Affect: Affect normal.          Relevant Results  Glucose (mg/dL)   Date Value   08/21/2024 104 (H)   02/21/2024 91   10/17/2023 135 (H)     Hemoglobin A1C (%)   Date " Value   08/21/2024 7.0 (H)   04/25/2024 6.6 (H)   01/18/2024 7.0 (H)     Bicarbonate (mmol/L)   Date Value   08/21/2024 28   02/21/2024 29   10/17/2023 26     Urea Nitrogen (mg/dL)   Date Value   08/21/2024 29 (H)   02/21/2024 28 (H)   10/17/2023 31 (H)     Creatinine (mg/dL)   Date Value   08/21/2024 1.48 (H)   02/21/2024 1.28   10/17/2023 1.37 (H)     Lab Results   Component Value Date    CHOL 104 08/21/2024    CHOL 104 02/21/2024    CHOL 111 10/16/2023     Lab Results   Component Value Date    HDL 46.4 08/21/2024    HDL 48.0 02/21/2024    HDL 59.5 10/16/2023     Lab Results   Component Value Date    LDLCALC 43 08/21/2024    LDLCALC 43 02/21/2024    LDLCALC 39 10/16/2023     Lab Results   Component Value Date    TRIG 75 08/21/2024    TRIG 67 02/21/2024    TRIG 61 10/16/2023       IMPRESSION  TYPE 2 DIABETES MELLITUS   LONG TERM CURRENT INSULIN USE   Glucose well controlled  He still has some overnight hypoglycemia    Patient is adhering to and benefitting from continuous glucose monitoring.       RECOMMENDATIONS  Decrease Humalog Mix 75/25 to 28 units twice daily      Follow up 6 months  Repeat A1c before next appointment if not already done.

## 2024-09-05 NOTE — LETTER
"September 5, 2024     Faviola Lombardo DO  [address]  [facility], [address]  [facility] [address]    Patient: TUNDE Khanna   YOB: 1945   Date of Visit: 9/5/2024       Dear Dr. Faviola Lombardo DO:    Thank you for referring ED Jey to me for evaluation. Below are my notes for this consultation.  If you have questions, please do not hesitate to call me. I look forward to following your patient along with you.       Sincerely,     Nitin Novak MD      CC: No Recipients  ______________________________________________________________________________________    History Of Present Illness  Sean Khanna \"ED\" is a 78 y.o. male     Duration of type 2 diabetes mellitus:  6 years  Complications:  Coronary artery disease     Interval history of left Baker's cyst  Started compression stocking    Humalog Mix 75/25  Breakfast 30 units  Dinner 30 units     Jardiance 25 mg/day  Metformin 850 mg BID     FreeStyle Neyda 2  Patient is testing glucose 288 times daily  Records reviewed, on file     Last eye exam:  July 2024     He is still having overnight hypoglycemia     Last eye exam:  March 2024    Past Medical History  He has a past medical history of Coronary artery disease involving native coronary artery of native heart without angina pectoris (03/05/2024), Longstanding persistent atrial fibrillation (Multi) (03/05/2024), and PAF (paroxysmal atrial fibrillation) (Multi) (03/05/2024).    Surgical History  He has a past surgical history that includes Other surgical history (11/18/2022).     Social History  He reports that he has never smoked. He has never used smokeless tobacco. He reports that he does not drink alcohol and does not use drugs.    Family History  Family History   Problem Relation Name Age of Onset   • Hypertension Mother     • Hyperlipidemia Mother     • Heart attack Mother     • Hypertension Father     • Hyperlipidemia Father     • Heart attack Father   " "      Medications  Current Outpatient Medications   Medication Instructions   • aspirin 81 mg, oral, Daily   • Eliquis 5 mg, oral, 2 times daily   • HumaLOG Mix 75-25 KwikPen 100 unit/mL (75-25) injection 30 Units, subcutaneous, 2 times daily (morning and late afternoon)   • Jardiance 25 mg, oral, Daily   • ketoconazole (NIZOral) 2 % shampoo Topical, 3 times weekly   • ketoconazole-iodoquinoL-hc 2-1-2.5 % cream Apply a thin layer to the affect area in the morning and at night   • lisinopriL-hydrochlorothiazide 20-25 mg tablet 1 tablet, oral, Daily   • lisinopril 10 mg, oral, Daily   • metFORMIN (GLUCOPHAGE) 850 mg, oral, 2 times daily   • OneTouch Delica Plus Lancet 30 gauge misc FOR GLUCOSE TESTING 3 TIMES DAILY   • OneTouch Verio Flex meter misc FOR GLUCOSE TESTING 3 TIMES DAILY   • OneTouch Verio test strips strip TEST 3 TIMES DAILY.   • pen needle, diabetic (BD Gina 2nd Gen Pen Needle) 32 gauge x 5/32\" needle USE AS DIRECTED   • simvastatin (ZOCOR) 40 mg, oral, Nightly       Allergies  Patient has no known allergies.    Review of Systems   Constitutional:  Negative for appetite change and fever.   HENT:  Negative for sore throat.         Denies dry mouth   Eyes:  Negative for visual disturbance.   Respiratory:  Negative for cough and shortness of breath.    Cardiovascular:  Negative for chest pain.   Gastrointestinal:  Negative for abdominal pain, constipation, diarrhea, nausea and vomiting.   Endocrine: Negative for polydipsia and polyuria.   Genitourinary:  Negative for frequency.   Musculoskeletal:  Negative for arthralgias.   Skin:  Negative for rash.   Neurological:  Negative for headaches.   Psychiatric/Behavioral:  The patient is not nervous/anxious.          Last Recorded Vitals  Blood pressure 170/83, pulse 61, weight 108 kg (238 lb 1.6 oz).    Physical Exam  Constitutional:       General: He is not in acute distress.  HENT:      Head: Normocephalic.      Mouth/Throat:      Mouth: Mucous membranes are " moist.   Eyes:      Extraocular Movements: Extraocular movements intact.   Neck:      Thyroid: No thyroid mass or thyromegaly.   Cardiovascular:      Pulses:           Radial pulses are 2+ on the right side and 2+ on the left side.   Musculoskeletal:      Right lower leg: No edema.      Left lower leg: No edema.   Lymphadenopathy:      Cervical: No cervical adenopathy.   Neurological:      Mental Status: He is alert.      Motor: No tremor.   Psychiatric:         Mood and Affect: Affect normal.          Relevant Results  Glucose (mg/dL)   Date Value   08/21/2024 104 (H)   02/21/2024 91   10/17/2023 135 (H)     Hemoglobin A1C (%)   Date Value   08/21/2024 7.0 (H)   04/25/2024 6.6 (H)   01/18/2024 7.0 (H)     Bicarbonate (mmol/L)   Date Value   08/21/2024 28   02/21/2024 29   10/17/2023 26     Urea Nitrogen (mg/dL)   Date Value   08/21/2024 29 (H)   02/21/2024 28 (H)   10/17/2023 31 (H)     Creatinine (mg/dL)   Date Value   08/21/2024 1.48 (H)   02/21/2024 1.28   10/17/2023 1.37 (H)     Lab Results   Component Value Date    CHOL 104 08/21/2024    CHOL 104 02/21/2024    CHOL 111 10/16/2023     Lab Results   Component Value Date    HDL 46.4 08/21/2024    HDL 48.0 02/21/2024    HDL 59.5 10/16/2023     Lab Results   Component Value Date    LDLCALC 43 08/21/2024    LDLCALC 43 02/21/2024    LDLCALC 39 10/16/2023     Lab Results   Component Value Date    TRIG 75 08/21/2024    TRIG 67 02/21/2024    TRIG 61 10/16/2023       IMPRESSION  TYPE 2 DIABETES MELLITUS   LONG TERM CURRENT INSULIN USE   Glucose well controlled  He still has some overnight hypoglycemia    Patient is adhering to and benefitting from continuous glucose monitoring.       RECOMMENDATIONS  Decrease Humalog Mix 75/25 to 28 units twice daily      Follow up 6 months  Repeat A1c before next appointment if not already done.

## 2024-11-22 ENCOUNTER — OFFICE VISIT (OUTPATIENT)
Dept: PRIMARY CARE | Facility: CLINIC | Age: 79
End: 2024-11-22
Payer: COMMERCIAL

## 2024-11-22 VITALS
WEIGHT: 232.6 LBS | DIASTOLIC BLOOD PRESSURE: 76 MMHG | HEART RATE: 76 BPM | BODY MASS INDEX: 30.69 KG/M2 | OXYGEN SATURATION: 96 % | SYSTOLIC BLOOD PRESSURE: 124 MMHG

## 2024-11-22 DIAGNOSIS — N63.0 BREAST MASS IN MALE: Primary | ICD-10-CM

## 2024-11-22 DIAGNOSIS — N63.20 MASS OF LEFT BREAST, UNSPECIFIED QUADRANT: ICD-10-CM

## 2024-11-22 PROCEDURE — 3078F DIAST BP <80 MM HG: CPT | Performed by: STUDENT IN AN ORGANIZED HEALTH CARE EDUCATION/TRAINING PROGRAM

## 2024-11-22 PROCEDURE — 1159F MED LIST DOCD IN RCRD: CPT | Performed by: STUDENT IN AN ORGANIZED HEALTH CARE EDUCATION/TRAINING PROGRAM

## 2024-11-22 PROCEDURE — 3074F SYST BP LT 130 MM HG: CPT | Performed by: STUDENT IN AN ORGANIZED HEALTH CARE EDUCATION/TRAINING PROGRAM

## 2024-11-22 PROCEDURE — 99213 OFFICE O/P EST LOW 20 MIN: CPT | Performed by: STUDENT IN AN ORGANIZED HEALTH CARE EDUCATION/TRAINING PROGRAM

## 2024-11-22 PROCEDURE — 1036F TOBACCO NON-USER: CPT | Performed by: STUDENT IN AN ORGANIZED HEALTH CARE EDUCATION/TRAINING PROGRAM

## 2024-11-22 RX ORDER — AMOXICILLIN 500 MG/1
CAPSULE ORAL
COMMUNITY
Start: 2024-11-20

## 2024-11-22 NOTE — PROGRESS NOTES
Subjective   Patient ID: TUNDE Khanna is a 79 y.o. male who presents for Left Side Pec Swelling.    HPI   Patient is presenting into the office today to discuss signs/symptoms of left breast swelling/mass    Stated that he has been noticing some slight discomfort over the past 1-2 months    Notes a minor swelling just around the areola and feels that the discomfort only happens in the morning    Denies any erythema, redness, discharge, or any other acute signs/symptoms    Wishes to discuss this further and have this further evaluated if medically appropriate    Denies any trauma to the area    Review of Systems  All pertinent positive symptoms are included in the history of present illness.    All other systems have been reviewed and are negative and noncontributory to this patient's current ailments.    Objective   /76 (BP Location: Left arm, Patient Position: Sitting, BP Cuff Size: Adult)   Pulse 76   Wt 106 kg (232 lb 9.6 oz)   SpO2 96%   BMI 30.69 kg/m²     Physical Exam  CONSTITUTIONAL - well nourished, well developed, looks like stated age, in no acute distress, not ill-appearing, and not tired appearing  SKIN - normal skin color and pigmentation, normal skin turgor without rash, lesions, or nodules visualized  Breast -noted approximately 1 cm lesion below the areola, nontender to the touch, no erythema, no dimpling  HEAD - no trauma, normocephalic  EYES - normal external exam  CHEST -no distressed breathing, good effort  EXTREMITIES - no edema, no deformities  NEUROLOGICAL - normal balance, normal motor, no ataxia  PSYCHIATRIC - alert, pleasant and cordial, age-appropriate    Assessment/Plan   We discussed this new breast lesion/mass and I did order an ultrasound as well as a diagnostic mammogram to be done at your earliest mutual convenience    Pending your results, we will make recommendations accordingly    If this worsens acutely over the weekend please notify the office and/or go to the nearest  emergency room to be acutely evaluated

## 2024-11-29 ENCOUNTER — HOSPITAL ENCOUNTER (OUTPATIENT)
Dept: RADIOLOGY | Facility: CLINIC | Age: 79
Discharge: HOME | End: 2024-11-29
Payer: COMMERCIAL

## 2024-11-29 DIAGNOSIS — N63.20 MASS OF LEFT BREAST, UNSPECIFIED QUADRANT: ICD-10-CM

## 2024-11-29 DIAGNOSIS — N63.0 BREAST MASS IN MALE: ICD-10-CM

## 2024-11-29 PROCEDURE — 76642 ULTRASOUND BREAST LIMITED: CPT | Performed by: RADIOLOGY

## 2024-11-29 PROCEDURE — 77066 DX MAMMO INCL CAD BI: CPT | Performed by: RADIOLOGY

## 2024-11-29 PROCEDURE — 76642 ULTRASOUND BREAST LIMITED: CPT | Mod: LT

## 2024-11-29 PROCEDURE — G0279 TOMOSYNTHESIS, MAMMO: HCPCS | Performed by: RADIOLOGY

## 2024-11-29 PROCEDURE — 77066 DX MAMMO INCL CAD BI: CPT

## 2024-12-01 NOTE — RESULT ENCOUNTER NOTE
Ultrasound/mammogram shows bilateral benign gynecomastia with the left greater than the right  This is not worrisome per the radiologist    If this continues to be an issue, I would recommend that we have him follow-up with a breast specialist

## 2024-12-03 DIAGNOSIS — E11.9 TYPE 2 DIABETES MELLITUS WITHOUT COMPLICATIONS (MULTI): ICD-10-CM

## 2024-12-03 RX ORDER — PEN NEEDLE, DIABETIC 32GX 5/32"
NEEDLE, DISPOSABLE MISCELLANEOUS
Qty: 100 EACH | Refills: 1 | Status: SHIPPED | OUTPATIENT
Start: 2024-12-03

## 2025-01-17 DIAGNOSIS — I10 ESSENTIAL (PRIMARY) HYPERTENSION: ICD-10-CM

## 2025-01-17 RX ORDER — LISINOPRIL 10 MG/1
10 TABLET ORAL DAILY
Qty: 90 TABLET | Refills: 0 | Status: SHIPPED | OUTPATIENT
Start: 2025-01-17

## 2025-01-22 DIAGNOSIS — Z79.4 TYPE 2 DIABETES MELLITUS WITHOUT COMPLICATION, WITH LONG-TERM CURRENT USE OF INSULIN (MULTI): ICD-10-CM

## 2025-01-22 DIAGNOSIS — E11.9 TYPE 2 DIABETES MELLITUS WITHOUT COMPLICATION, WITH LONG-TERM CURRENT USE OF INSULIN (MULTI): ICD-10-CM

## 2025-01-22 RX ORDER — METFORMIN HYDROCHLORIDE 850 MG/1
850 TABLET ORAL 2 TIMES DAILY
Qty: 180 TABLET | Refills: 3 | Status: SHIPPED | OUTPATIENT
Start: 2025-01-22 | End: 2026-01-22

## 2025-02-25 ENCOUNTER — APPOINTMENT (OUTPATIENT)
Dept: PRIMARY CARE | Facility: CLINIC | Age: 80
End: 2025-02-25
Payer: COMMERCIAL

## 2025-02-25 VITALS
HEIGHT: 73 IN | OXYGEN SATURATION: 95 % | SYSTOLIC BLOOD PRESSURE: 110 MMHG | WEIGHT: 226 LBS | HEART RATE: 64 BPM | DIASTOLIC BLOOD PRESSURE: 76 MMHG | BODY MASS INDEX: 29.95 KG/M2

## 2025-02-25 DIAGNOSIS — Z12.5 PROSTATE CANCER SCREENING: ICD-10-CM

## 2025-02-25 DIAGNOSIS — Z00.00 ENCOUNTER FOR MEDICARE ANNUAL WELLNESS EXAM: Primary | ICD-10-CM

## 2025-02-25 DIAGNOSIS — I48.20 CHRONIC ATRIAL FIBRILLATION (MULTI): ICD-10-CM

## 2025-02-25 DIAGNOSIS — Z00.00 HEALTHCARE MAINTENANCE: ICD-10-CM

## 2025-02-25 DIAGNOSIS — E11.9 TYPE 2 DIABETES MELLITUS WITHOUT COMPLICATION, WITH LONG-TERM CURRENT USE OF INSULIN (MULTI): ICD-10-CM

## 2025-02-25 DIAGNOSIS — I10 PRIMARY HYPERTENSION: ICD-10-CM

## 2025-02-25 DIAGNOSIS — E78.2 MIXED HYPERLIPIDEMIA: ICD-10-CM

## 2025-02-25 DIAGNOSIS — I48.91 ATRIAL FIBRILLATION, UNSPECIFIED TYPE (MULTI): ICD-10-CM

## 2025-02-25 DIAGNOSIS — E11.49 WELL CONTROLLED TYPE 2 DIABETES MELLITUS WITH NEUROLOGICAL MANIFESTATIONS: ICD-10-CM

## 2025-02-25 DIAGNOSIS — I10 ESSENTIAL (PRIMARY) HYPERTENSION: ICD-10-CM

## 2025-02-25 DIAGNOSIS — N18.30 STAGE 3 CHRONIC KIDNEY DISEASE, UNSPECIFIED WHETHER STAGE 3A OR 3B CKD (MULTI): ICD-10-CM

## 2025-02-25 DIAGNOSIS — Z79.4 TYPE 2 DIABETES MELLITUS WITHOUT COMPLICATION, WITH LONG-TERM CURRENT USE OF INSULIN (MULTI): ICD-10-CM

## 2025-02-25 PROCEDURE — G0446 INTENS BEHAVE THER CARDIO DX: HCPCS | Performed by: STUDENT IN AN ORGANIZED HEALTH CARE EDUCATION/TRAINING PROGRAM

## 2025-02-25 PROCEDURE — 1170F FXNL STATUS ASSESSED: CPT | Performed by: STUDENT IN AN ORGANIZED HEALTH CARE EDUCATION/TRAINING PROGRAM

## 2025-02-25 PROCEDURE — G0439 PPPS, SUBSEQ VISIT: HCPCS | Performed by: STUDENT IN AN ORGANIZED HEALTH CARE EDUCATION/TRAINING PROGRAM

## 2025-02-25 PROCEDURE — 3078F DIAST BP <80 MM HG: CPT | Performed by: STUDENT IN AN ORGANIZED HEALTH CARE EDUCATION/TRAINING PROGRAM

## 2025-02-25 PROCEDURE — 99214 OFFICE O/P EST MOD 30 MIN: CPT | Performed by: STUDENT IN AN ORGANIZED HEALTH CARE EDUCATION/TRAINING PROGRAM

## 2025-02-25 PROCEDURE — 99397 PER PM REEVAL EST PAT 65+ YR: CPT | Performed by: STUDENT IN AN ORGANIZED HEALTH CARE EDUCATION/TRAINING PROGRAM

## 2025-02-25 PROCEDURE — 3074F SYST BP LT 130 MM HG: CPT | Performed by: STUDENT IN AN ORGANIZED HEALTH CARE EDUCATION/TRAINING PROGRAM

## 2025-02-25 PROCEDURE — 1124F ACP DISCUSS-NO DSCNMKR DOCD: CPT | Performed by: STUDENT IN AN ORGANIZED HEALTH CARE EDUCATION/TRAINING PROGRAM

## 2025-02-25 PROCEDURE — 1159F MED LIST DOCD IN RCRD: CPT | Performed by: STUDENT IN AN ORGANIZED HEALTH CARE EDUCATION/TRAINING PROGRAM

## 2025-02-25 RX ORDER — LISINOPRIL 10 MG/1
10 TABLET ORAL DAILY
Qty: 90 TABLET | Refills: 1 | Status: CANCELLED | OUTPATIENT
Start: 2025-02-25

## 2025-02-25 RX ORDER — SIMVASTATIN 40 MG/1
40 TABLET, FILM COATED ORAL NIGHTLY
Qty: 90 TABLET | Refills: 1 | Status: SHIPPED | OUTPATIENT
Start: 2025-02-25

## 2025-02-25 RX ORDER — LISINOPRIL AND HYDROCHLOROTHIAZIDE 20; 25 MG/1; MG/1
1 TABLET ORAL DAILY
Qty: 90 TABLET | Refills: 1 | Status: SHIPPED | OUTPATIENT
Start: 2025-02-25

## 2025-02-25 ASSESSMENT — ACTIVITIES OF DAILY LIVING (ADL)
GROCERY_SHOPPING: INDEPENDENT
BATHING: INDEPENDENT
DOING_HOUSEWORK: INDEPENDENT
TAKING_MEDICATION: INDEPENDENT
DRESSING: INDEPENDENT
MANAGING_FINANCES: INDEPENDENT

## 2025-02-25 ASSESSMENT — ENCOUNTER SYMPTOMS
LOSS OF SENSATION IN FEET: 0
DEPRESSION: 0
OCCASIONAL FEELINGS OF UNSTEADINESS: 0

## 2025-02-25 NOTE — PROGRESS NOTES
Subjective   Reason for Visit: Sean Khanna is an 79 y.o. male here for a Medicare Wellness visit.          Reviewed all medications by prescribing practitioner or clinical pharmacist (such as prescriptions, OTCs, herbal therapies and supplements) and documented in the medical record.    HPI    Medicare annual wellness examination/healthcare maintenance  Immunization: Tdap- needs to be updated   Shingrix-up-to-date x 2  PPSV23 performed in 2019, PCV 13 performed in 2018  RSV up-to-date in October 2023  Influenza vaccine declines   COVID-19 vaccine: Up-to-date  Colon Cancer Screening: Colonoscopy done 2-3 years ago, no polyps found. He declines future colonoscopies  Diet: well balanced  Exercise: working on it   Tobacco: former smoker, quit 30 years ago   EtOH: Occasionally, socially    Other concerns discussed at today's visit:    1. Diabetes Mellitus Type II/CKD  Takes Jardiance 25 mg, Metformin 850 twice a day, and humalog   Most recent hemoglobin A1c at 7.0%  Follows with Dr. Novak in endocrinology to manage his diabetes, has an appointment in March of this year  Most recent GFR did decrease slightly down to 48%  Denies polyuria, polydipsia, numbness or tingling      2. Atrial Fibrillation  Takes metoprolol 12.5 mg twice a day   Following with Dr. Russell with his next appointment early in March  Denies chest pain, chest pressure, heart palpitations or any other acute cardiopulmonary symptoms      3. Hyperlipidemia   Takes Simvastatin 40 mg   Most recent cholesterol panel did know stability at 104, HDL 46, LDL 43, triglyceride 75  Requesting refills     4. Hypertension   Takes Metoprolol 12.5 mg twice a day, lisinopril 30 mg, and HCTZ 25 mg   Blood pressure has been stable in the past as well as stable today  Denies chest pain, lightheadedness, dizziness or any other acute cardiopulmonary symptoms      5.  Right shoulder OA  Has a longstanding history of right-sided shoulder pain, which has since completely  resolved since reducing his exercise  Imaging done on 10/16/23 showed mild osteoarthritis of the right AC joint   He is not interested in taking medication or receiving steroid shot  Currently is slowly beginning to exercise again and will monitor if pain starts up again  Has improved, will occasionally bother him     6.  Dry Cough   Patient has been having dry cough lately but very rare in occurrence   He says he thinks it is likely due to Lisinopril  Otherwise, feels well  Would like to stay on the lisinopril     No Known Allergies    Immunization History   Administered Date(s) Administered    Influenza, Seasonal, Quadrivalent, Adjuvanted 10/02/2023    Influenza, seasonal, injectable 10/02/2023    Moderna COVID-19 vaccine, bivalent, blue cap/gray label *Check age/dose* 11/10/2022    Moderna SARS-CoV-2 Vaccination 02/22/2021, 03/22/2021, 11/16/2021    Pfizer COVID-19 vaccine, 12 years and older, (30mcg/0.3mL) (Comirnaty) 10/05/2023    Pfizer Purple Cap SARS-CoV-2 10/05/2023    Pneumococcal Conjugate PCV 7 1945    Pneumococcal conjugate vaccine, 13-valent (PREVNAR 13) 10/02/2018    Pneumococcal polysaccharide vaccine, 23-valent, age 2 years and older (PNEUMOVAX 23) 12/10/2019    RSV, 60 Years And Older (AREXVY) 10/16/2023    Zoster vaccine, recombinant, adult (SHINGRIX) 02/08/2024, 05/14/2024       Patient Self Assessment of Health Status  Patient Self Assessment: Good    Nutrition and Exercise  Current Diet: Well Balanced Diet  Adequate Fluid Intake: No  Caffeine: Yes  Exercise Frequency: No Exercise    Functional Ability/Level of Safety  Cognitive Impairment Observed: No cognitive impairment observed    Home Safety Risk Factors: None    Patient Care Team:  Ante T Pletikosic, DO as PCP - General  Ante T Pletikosic, DO as PCP - [employer] PCP     Review of Systems  All pertinent positive symptoms are included in the history of present illness.    All other systems have been reviewed and are  "negative and noncontributory to this patient's current ailments.    Objective   Vitals:  /76 (BP Location: Left arm, Patient Position: Sitting, BP Cuff Size: Adult)   Pulse 64   Ht 1.854 m (6' 1\")   Wt 103 kg (226 lb)   SpO2 95%   BMI 29.82 kg/m²     No visits with results within 6 Month(s) from this visit.   Latest known visit with results is:   Lab on 08/21/2024   Component Date Value Ref Range Status    Cholesterol 08/21/2024 104  0 - 199 mg/dL Final          Age      Desirable   Borderline High   High     0-19 Y     0 - 169       170 - 199     >/= 200    20-24 Y     0 - 189       190 - 224     >/= 225         >24 Y     0 - 199       200 - 239     >/= 240   **All ranges are based on fasting samples. Specific   therapeutic targets will vary based on patient-specific   cardiac risk.    Pediatric guidelines reference:Pediatrics 2011, 128(S5).Adult guidelines reference: NCEP ATPIII Guidelines,ABI 2001, 258:2486-97    Venipuncture immediately after or during the administration of Metamizole may lead to falsely low results. Testing should be performed immediately prior to Metamizole dosing.    HDL-Cholesterol 08/21/2024 46.4  mg/dL Final      Age       Very Low   Low     Normal    High    0-19 Y    < 35      < 40     40-45     ----  20-24 Y    ----     < 40      >45      ----        >24 Y      ----     < 40     40-60      >60      Cholesterol/HDL Ratio 08/21/2024 2.2   Final      Ref Values  Desirable  < 3.4  High Risk  > 5.0    LDL Calculated 08/21/2024 43  <=99 mg/dL Final                                Near   Borderline      AGE      Desirable  Optimal    High     High     Very High     0-19 Y     0 - 109     ---    110-129   >/= 130     ----    20-24 Y     0 - 119     ---    120-159   >/= 160     ----      >24 Y     0 -  99   100-129  130-159   160-189     >/=190      VLDL 08/21/2024 15  0 - 40 mg/dL Final    Triglycerides 08/21/2024 75  0 - 149 mg/dL Final       Age         Desirable   Borderline High  "  High     Very High   0 D-90 D    19 - 174         ----         ----        ----  91 D- 9 Y     0 -  74        75 -  99     >/= 100      ----    10-19 Y     0 -  89        90 - 129     >/= 130      ----    20-24 Y     0 - 114       115 - 149     >/= 150      ----         >24 Y     0 - 149       150 - 199    200- 499    >/= 500    Venipuncture immediately after or during the administration of Metamizole may lead to falsely low results. Testing should be performed immediately prior to Metamizole dosing.    Non HDL Cholesterol 08/21/2024 58  0 - 149 mg/dL Final          Age       Desirable   Borderline High   High     Very High     0-19 Y     0 - 119       120 - 144     >/= 145    >/= 160    20-24 Y     0 - 149       150 - 189     >/= 190      ----         >24 Y    30 mg/dL above LDL Cholesterol goal      Glucose 08/21/2024 104 (H)  74 - 99 mg/dL Final    Sodium 08/21/2024 140  136 - 145 mmol/L Final    Potassium 08/21/2024 4.7  3.5 - 5.3 mmol/L Final    Chloride 08/21/2024 102  98 - 107 mmol/L Final    Bicarbonate 08/21/2024 28  21 - 32 mmol/L Final    Anion Gap 08/21/2024 15  10 - 20 mmol/L Final    Urea Nitrogen 08/21/2024 29 (H)  6 - 23 mg/dL Final    Creatinine 08/21/2024 1.48 (H)  0.50 - 1.30 mg/dL Final    eGFR 08/21/2024 48 (L)  >60 mL/min/1.73m*2 Final    Calculations of estimated GFR are performed using the 2021 CKD-EPI Study Refit equation without the race variable for the IDMS-Traceable creatinine methods.  https://jasn.asnjournals.org/content/early/2021/09/22/ASN.2500655158    Calcium 08/21/2024 9.2  8.6 - 10.3 mg/dL Final    Albumin 08/21/2024 3.9  3.4 - 5.0 g/dL Final    Alkaline Phosphatase 08/21/2024 77  33 - 136 U/L Final    Total Protein 08/21/2024 7.6  6.4 - 8.2 g/dL Final    AST 08/21/2024 14  9 - 39 U/L Final    Bilirubin, Total 08/21/2024 0.6  0.0 - 1.2 mg/dL Final    ALT 08/21/2024 15  10 - 52 U/L Final    Patients treated with Sulfasalazine may generate falsely decreased results for ALT.     Hemoglobin A1C 08/21/2024 7.0 (H)  see below % Final    Estimated Average Glucose 08/21/2024 154  Not Established mg/dL Final       Physical Exam  CONSTITUTIONAL - well nourished, well developed, looks like stated age, in no acute distress, not ill-appearing, and not tired appearing  SKIN - normal skin color and pigmentation  HEAD - no trauma, normocephalic  ENT - TM's intact, no injection, no signs of infection, uvula midline, normal tongue movement and throat normal, no exudate, nasal passage without discharge and patent  EYES - pupils are equal and reactive to light  NECK - supple without rigidity  CHEST - clear to auscultation, no wheezing, no crackles and no rales, good effort  CARDIAC - regular rate and regular rhythm, no skipped beats, no murmur  ABDOMEN - no organomegaly, soft, nontender, nondistended  EXTREMITIES - no edema, no deformities  NEUROLOGICAL - normal gait, normal balance, normal motor  PSYCHIATRIC - alert, pleasant and cordial, age-appropriate  IMMUNOLOGIC - no cervical lymphadenopathy    Assessment/Plan     Medicare annual wellness examination/healthcare maintenance  Complete history and physical examination was performed alongside and [facility] visit as noted above  I recommend eating a well balanced diet and exercising regularly  Please get blood work done after today's appointment  We will contact you if medication changes must be made, depending on lab results      1. Diabetes Mellitus Type II/CKD  Continue Jardiance 25 mg, Metformin 850 twice a day, and humalog  Continue to follow up with endocrinologist with your next scheduled appointment in the near future  Hemoglobin A1c did note stability but we will continue monitoring closely  GFR did slightly decrease so we will continue monitoring this as well  We will continue monitoring closely so please stay hydrated at this time     2. Atrial Fibrillation  Continue Metoprolol 12.5 mg twice a day   Continue following with Dr. Russell   I would like to  have you monitor and record blood pressures at home   Blood pressure goal should be below 130/80, ideally 120/80  Please follow-up with your cardiologist at your scheduled appointment in the next couple of weeks  Cardiovascular risk discussed and, if needed, lifestyle modifications recommended, including nutritional choices, exercise, and elimination of habits contributing to risk.    We agreed on a plan to reduce the current cardiovascular risk     3. Hyperlipidemia   Continue Simvastatin 40 mg   I have provided you with a requisition for a lipid panel   I will notify you of the results and make treatment recommendations accordingly      4. Hypertension   Continue Metoprolol 12.5 mg twice a day, and hydrochlorothiazide 25 mg daily  We will decrease your lisinopril from 30 mg once daily to 20 mg once daily as your blood pressure is at 110/76 at this visit and we would like to avoid any episodes of lightheadedness or dizziness.  Please let us know if you start to develop hypertensive blood pressure readings and we will make changes accordingly.  I would like to have you monitor and record blood pressures at home   Blood pressure goal should be below 130/80, ideally 120/80     5  Right shoulder pain  Continue monitoring signs/symptoms      6. Dry Cough   Continue monitoring signs/symptoms

## 2025-02-26 DIAGNOSIS — I48.11 LONGSTANDING PERSISTENT ATRIAL FIBRILLATION (MULTI): Primary | ICD-10-CM

## 2025-02-26 LAB
ALBUMIN SERPL-MCNC: 3.9 G/DL (ref 3.6–5.1)
ALP SERPL-CCNC: 87 U/L (ref 35–144)
ALT SERPL-CCNC: 12 U/L (ref 9–46)
ANION GAP SERPL CALCULATED.4IONS-SCNC: 10 MMOL/L (CALC) (ref 7–17)
AST SERPL-CCNC: 11 U/L (ref 10–35)
BILIRUB SERPL-MCNC: 0.5 MG/DL (ref 0.2–1.2)
BUN SERPL-MCNC: 25 MG/DL (ref 7–25)
CALCIUM SERPL-MCNC: 9.4 MG/DL (ref 8.6–10.3)
CHLORIDE SERPL-SCNC: 101 MMOL/L (ref 98–110)
CHOLEST SERPL-MCNC: 111 MG/DL
CHOLEST/HDLC SERPL: 2.6 (CALC)
CO2 SERPL-SCNC: 30 MMOL/L (ref 20–32)
CREAT SERPL-MCNC: 1.37 MG/DL (ref 0.7–1.28)
EGFRCR SERPLBLD CKD-EPI 2021: 53 ML/MIN/1.73M2
EST. AVERAGE GLUCOSE BLD GHB EST-MCNC: 140 MG/DL
EST. AVERAGE GLUCOSE BLD GHB EST-SCNC: 7.7 MMOL/L
GLUCOSE SERPL-MCNC: 97 MG/DL (ref 65–99)
HBA1C MFR BLD: 6.5 % OF TOTAL HGB
HDLC SERPL-MCNC: 43 MG/DL
LDLC SERPL CALC-MCNC: 53 MG/DL (CALC)
NONHDLC SERPL-MCNC: 68 MG/DL (CALC)
POTASSIUM SERPL-SCNC: 4.6 MMOL/L (ref 3.5–5.3)
PROT SERPL-MCNC: 7.6 G/DL (ref 6.1–8.1)
PSA SERPL-MCNC: 2.3 NG/ML
SODIUM SERPL-SCNC: 141 MMOL/L (ref 135–146)
TRIGL SERPL-MCNC: 70 MG/DL

## 2025-02-26 RX ORDER — APIXABAN 5 MG/1
5 TABLET, FILM COATED ORAL 2 TIMES DAILY
Qty: 60 TABLET | Refills: 0 | Status: SHIPPED | OUTPATIENT
Start: 2025-02-26

## 2025-02-26 NOTE — RESULT ENCOUNTER NOTE
Sugar, liver, electrolytes are all within normal limits  Kidney function slightly decreased at 53% and creatinine increased at 1.37  I recommend increased water intake    Hemoglobin A1c one of the best on file at 6.5%    Cholesterol looks good at 111, HDL 43, triglycerides 70, LDL 53    Prostate within normal limits

## 2025-03-05 ENCOUNTER — OFFICE VISIT (OUTPATIENT)
Dept: CARDIOLOGY | Facility: CLINIC | Age: 80
End: 2025-03-05
Payer: MEDICARE

## 2025-03-05 VITALS
HEART RATE: 60 BPM | SYSTOLIC BLOOD PRESSURE: 146 MMHG | WEIGHT: 229 LBS | DIASTOLIC BLOOD PRESSURE: 66 MMHG | BODY MASS INDEX: 30.21 KG/M2

## 2025-03-05 DIAGNOSIS — I10 PRIMARY HYPERTENSION: ICD-10-CM

## 2025-03-05 DIAGNOSIS — I48.11 LONGSTANDING PERSISTENT ATRIAL FIBRILLATION (MULTI): ICD-10-CM

## 2025-03-05 DIAGNOSIS — E78.00 PURE HYPERCHOLESTEROLEMIA: ICD-10-CM

## 2025-03-05 DIAGNOSIS — I25.10 CORONARY ARTERY DISEASE INVOLVING NATIVE CORONARY ARTERY OF NATIVE HEART WITHOUT ANGINA PECTORIS: Primary | ICD-10-CM

## 2025-03-05 LAB
ATRIAL RATE: 86 BPM
Q ONSET: 228 MS
QRS COUNT: 10 BEATS
QRS DURATION: 74 MS
QT INTERVAL: 422 MS
QTC CALCULATION(BAZETT): 422 MS
QTC FREDERICIA: 422 MS
R AXIS: 94 DEGREES
T AXIS: 40 DEGREES
T OFFSET: 439 MS
VENTRICULAR RATE: 60 BPM

## 2025-03-05 PROCEDURE — 93005 ELECTROCARDIOGRAM TRACING: CPT | Performed by: INTERNAL MEDICINE

## 2025-03-05 PROCEDURE — 3077F SYST BP >= 140 MM HG: CPT | Performed by: INTERNAL MEDICINE

## 2025-03-05 PROCEDURE — 1159F MED LIST DOCD IN RCRD: CPT | Performed by: INTERNAL MEDICINE

## 2025-03-05 PROCEDURE — 1160F RVW MEDS BY RX/DR IN RCRD: CPT | Performed by: INTERNAL MEDICINE

## 2025-03-05 PROCEDURE — 1036F TOBACCO NON-USER: CPT | Performed by: INTERNAL MEDICINE

## 2025-03-05 PROCEDURE — 99214 OFFICE O/P EST MOD 30 MIN: CPT | Performed by: INTERNAL MEDICINE

## 2025-03-05 PROCEDURE — 3078F DIAST BP <80 MM HG: CPT | Performed by: INTERNAL MEDICINE

## 2025-03-05 RX ORDER — APIXABAN 5 MG/1
5 TABLET, FILM COATED ORAL 2 TIMES DAILY
Qty: 180 TABLET | Refills: 3 | Status: SHIPPED | OUTPATIENT
Start: 2025-03-05 | End: 2026-03-05

## 2025-03-05 NOTE — PROGRESS NOTES
"Chief Complaint:   Atrial Fibrillation     History of Present Illness     Sean Khanna \"ED\" is a 79 y.o. male presenting with coronary artery disease.  He had a history of angina and underwent PCI.  The patient is tolerating guideline-directed medical therapy with antiplatelet and statin medication and is compliant.  The patient does not exercise regularly and follows a heart healthy diet.  The patient has been well since their last office appointment and is not having any anginal symptoms or dyspnea on exertion.      Here for routine follow-up of long-standing (>12 months) persistent atrial fibrillation.  The patient has been asymptomatic.  The patient has been rate-controlled in atrial fibrillation on medical treatment which they are tolerating well and are compliant.  The current treatment strategy is rate control.  The CHADS2-VASC2 score is 4  and the ACC/AHA guidelines recommend anticoagulation for stroke prophylaxis.  The patient is being treated with Eliquis and has tolerated treatment with no bleeding and is compliant.      Review of Systems  All pertinent systems have been reviewed and are negative except for what is stated in the history of present illness.    All other systems have been reviewed and are negative and noncontributory to this patient's current ailments.             Previous History     Past Medical History:  He has a past medical history of Coronary artery disease involving native coronary artery of native heart without angina pectoris (03/05/2024), Longstanding persistent atrial fibrillation (Multi) (03/05/2024), and PAF (paroxysmal atrial fibrillation) (Multi) (03/05/2024).    Past Surgical History:  He has a past surgical history that includes Other surgical history (11/18/2022).      Social History:  He reports that he has never smoked. He has never used smokeless tobacco. He reports that he does not drink alcohol and does not use drugs.    Family History:  Family History   Problem " "Relation Name Age of Onset    Hypertension Mother      Hyperlipidemia Mother      Heart attack Mother      Hypertension Father      Hyperlipidemia Father      Heart attack Father          Allergies:  Patient has no known allergies.    Outpatient Medications:  Current Outpatient Medications   Medication Instructions    aspirin 81 mg, Daily    BD Gina 2nd Gen Pen Needle 32 gauge x 5/32\" needle USE AS DIRECTED    Eliquis 5 mg, oral, 2 times daily    HumaLOG Mix 75-25 KwikPen 100 unit/mL (75-25) injection 28 Units, subcutaneous, 2 times daily (morning and late afternoon)    Jardiance 25 mg, oral, Daily    ketoconazole (NIZOral) 2 % shampoo Topical, 3 times weekly    ketoconazole-iodoquinoL-hc 2-1-2.5 % cream Apply a thin layer to the affect area in the morning and at night    lisinopriL-hydrochlorothiazide 20-25 mg tablet 1 tablet, oral, Daily    metFORMIN (GLUCOPHAGE) 850 mg, oral, 2 times daily    OneTouch Delica Plus Lancet 30 gauge misc FOR GLUCOSE TESTING 3 TIMES DAILY    OneTouch Verio Flex meter misc FOR GLUCOSE TESTING 3 TIMES DAILY    OneTouch Verio test strips strip TEST 3 TIMES DAILY.    simvastatin (ZOCOR) 40 mg, oral, Nightly       Physical Examination   Vitals:  Visit Vitals  /66   Pulse 60   Wt 104 kg (229 lb)   BMI 30.21 kg/m²   Smoking Status Never   BSA 2.31 m²      Physical Exam  Vitals reviewed.   Constitutional:       General: He is not in acute distress.     Appearance: Normal appearance.   HENT:      Head: Normocephalic and atraumatic.      Nose: Nose normal.   Eyes:      Conjunctiva/sclera: Conjunctivae normal.   Cardiovascular:      Rate and Rhythm: Normal rate. Rhythm irregularly irregular.      Pulses: Normal pulses.      Heart sounds: No murmur heard.  Pulmonary:      Effort: Pulmonary effort is normal. No respiratory distress.      Breath sounds: Normal breath sounds. No wheezing, rhonchi or rales.   Abdominal:      General: Bowel sounds are normal. There is no distension.      " "Palpations: Abdomen is soft.      Tenderness: There is no abdominal tenderness.   Musculoskeletal:         General: No swelling.      Right lower leg: No edema.      Left lower leg: No edema.   Skin:     General: Skin is warm and dry.      Capillary Refill: Capillary refill takes less than 2 seconds.   Neurological:      General: No focal deficit present.      Mental Status: He is alert.   Psychiatric:         Mood and Affect: Mood normal.           Labs/Imaging/Cardiac Studies     Last Labs:  CBC -  No results found for: \"WBC\", \"HGB\", \"HCT\", \"MCV\", \"PLT\"    CMP -  Lab Results   Component Value Date    CALCIUM 9.4 02/25/2025    PROT 7.6 02/25/2025    ALBUMIN 3.9 02/25/2025    AST 11 02/25/2025    ALT 12 02/25/2025    ALKPHOS 87 02/25/2025    BILITOT 0.5 02/25/2025       LIPID PANEL -   Lab Results   Component Value Date    CHOL 111 02/25/2025    HDL 43 02/25/2025    CHHDL 2.6 02/25/2025    VLDL 15 08/21/2024    TRIG 70 02/25/2025    NHDL 68 02/25/2025       RENAL FUNCTION PANEL -   Lab Results   Component Value Date    K 4.6 02/25/2025       Lab Results   Component Value Date    HGBA1C 6.5 (H) 02/25/2025       Reviewed ECG and Labs    Echo:  No echocardiogram results found for the past 12 months       Assessment and Recommendations     Assessment/Plan     1. Pure hypercholesterolemia  Hyperlipidemia: The patient's lipids are well controlled on chronic simvastatin therapy and they are meeting their goal LDL cholesterol per the ACC/AHA guidelines.  The patient is compliant and tolerating statin therapy and is following a heart health diet and performs regular exercise.  The benefits of lipid lowering therapy have been discussed with the patient/family/caregiver.     2. Primary hypertension  Hypertension: The patient's blood pressure has been well-controlled at today's appointment or by recent primary care provider's measurements/home measurements and meets their goal blood pressure per the ACC/AHA guidelines.  The " "patient has been compliant with their anti-hypertensive medications and is following a low sodium/DASH diet and performs regular exercise.  I advised continuation of their present medical treatment and lifestyle modification.      3. Coronary artery disease involving native coronary artery of native heart without angina pectoris  CAD: The patient's CAD, as detailed in the HPI, has been clinically stable, without any anginal symptoms or dyspnea.  The patient will continue treatment with guideline-directed medical therapy with ASA and statin medications and will continue regular exercise and a heart healthy diet.        4. Longstanding persistent atrial fibrillation (CMS/HCC)  Persistent trial fibrillation: The patient has been clinically stable, asymptomatic with persistent, rate-controlled atrial fibrillation as detailed in the HPI.  They will continue treatment with  anticoagulation for stroke prophylaxis based upon their present [id number] score, the risks and benefits of which were discussed with the patient/family/caregiver.      Sean Khanna \"ED\" will return in 1 year for an office visit.     Orlando Russell MD    Exclusive of any other services or procedures performed, I, Orlando Russell MD , spent 30 minutes in duration for this visit today.  This time consisted of chart review, obtaining history, and/or performing the exam as documented above as well as documenting the clinical information for the encounter in the electronic record, discussing treatment options, plans, and/or goals with patient, family, and/or caregiver, refilling medications, updating the electronic record, ordering medicines, lab work, imaging, referrals, and/or procedures as documented above and communicating with other [facility]. I have discussed the results of laboratory, radiology, and cardiology studies with the patient and their family/caregiver.    "

## 2025-03-07 DIAGNOSIS — E11.9 TYPE 2 DIABETES MELLITUS WITHOUT COMPLICATIONS (MULTI): ICD-10-CM

## 2025-03-07 RX ORDER — PEN NEEDLE, DIABETIC 32GX 5/32"
NEEDLE, DISPOSABLE MISCELLANEOUS
Qty: 200 EACH | Refills: 3 | Status: SHIPPED | OUTPATIENT
Start: 2025-03-07

## 2025-03-09 RX ORDER — PEN NEEDLE, DIABETIC 32GX 5/32"
NEEDLE, DISPOSABLE MISCELLANEOUS
Qty: 100 EACH | Refills: 1 | Status: SHIPPED | OUTPATIENT
Start: 2025-03-09

## 2025-03-20 ENCOUNTER — APPOINTMENT (OUTPATIENT)
Dept: ENDOCRINOLOGY | Facility: CLINIC | Age: 80
End: 2025-03-20
Payer: COMMERCIAL

## 2025-03-20 VITALS
BODY MASS INDEX: 31.18 KG/M2 | DIASTOLIC BLOOD PRESSURE: 81 MMHG | SYSTOLIC BLOOD PRESSURE: 183 MMHG | HEART RATE: 55 BPM | WEIGHT: 236.33 LBS

## 2025-03-20 DIAGNOSIS — E11.9 TYPE 2 DIABETES MELLITUS WITHOUT COMPLICATION, WITH LONG-TERM CURRENT USE OF INSULIN (MULTI): ICD-10-CM

## 2025-03-20 DIAGNOSIS — Z79.4 TYPE 2 DIABETES MELLITUS WITHOUT COMPLICATION, WITH LONG-TERM CURRENT USE OF INSULIN (MULTI): ICD-10-CM

## 2025-03-20 PROCEDURE — 3077F SYST BP >= 140 MM HG: CPT | Performed by: INTERNAL MEDICINE

## 2025-03-20 PROCEDURE — 3079F DIAST BP 80-89 MM HG: CPT | Performed by: INTERNAL MEDICINE

## 2025-03-20 PROCEDURE — 99214 OFFICE O/P EST MOD 30 MIN: CPT | Performed by: INTERNAL MEDICINE

## 2025-03-20 PROCEDURE — G2211 COMPLEX E/M VISIT ADD ON: HCPCS | Performed by: INTERNAL MEDICINE

## 2025-03-20 PROCEDURE — 1160F RVW MEDS BY RX/DR IN RCRD: CPT | Performed by: INTERNAL MEDICINE

## 2025-03-20 PROCEDURE — 1036F TOBACCO NON-USER: CPT | Performed by: INTERNAL MEDICINE

## 2025-03-20 PROCEDURE — 95251 CONT GLUC MNTR ANALYSIS I&R: CPT | Performed by: INTERNAL MEDICINE

## 2025-03-20 PROCEDURE — 1159F MED LIST DOCD IN RCRD: CPT | Performed by: INTERNAL MEDICINE

## 2025-03-20 RX ORDER — PEN NEEDLE, DIABETIC 32GX 5/32"
NEEDLE, DISPOSABLE MISCELLANEOUS
Qty: 200 EACH | Refills: 3 | Status: SHIPPED | OUTPATIENT
Start: 2025-03-20

## 2025-03-20 RX ORDER — INSULIN LISPRO 100 [IU]/ML
28 INJECTION, SUSPENSION SUBCUTANEOUS
Qty: 60 ML | Refills: 3 | Status: SHIPPED | OUTPATIENT
Start: 2025-03-20

## 2025-03-20 ASSESSMENT — ENCOUNTER SYMPTOMS
ROS SKIN COMMENTS: DRY
POLYDIPSIA: 0
FEVER: 0
ARTHRALGIAS: 0
CONSTIPATION: 0
SORE THROAT: 0
FREQUENCY: 0
HEADACHES: 0
VOMITING: 0
DIARRHEA: 0
NAUSEA: 0
APPETITE CHANGE: 0
SHORTNESS OF BREATH: 0
ABDOMINAL PAIN: 0
NERVOUS/ANXIOUS: 0
COUGH: 0

## 2025-03-20 NOTE — PATIENT INSTRUCTIONS
RECOMMENDATIONS  Continue current program    Follow up 6 months  Labs as ordered by Dr. Lombardo  Bring written glucose record (2 weeks' worth) to all appointments.

## 2025-03-20 NOTE — PROGRESS NOTES
"History Of Present Illness  Sean Khanna \"ED\" is a 79 y.o. male     Duration of type 2 diabetes mellitus:  7 years  Complications:  Coronary artery disease      Humalog Mix 75/25  Breakfast 28 units  Dinner 28 units     Jardiance 25 mg/day  Metformin 850 mg BID     FreeStyle Neyda 2  Patient is testing glucose 288 times daily  Records reviewed, on file     Last eye exam:  July 2024       Past Medical History  He has a past medical history of Coronary artery disease involving native coronary artery of native heart without angina pectoris (03/05/2024), Longstanding persistent atrial fibrillation (Multi) (03/05/2024), and PAF (paroxysmal atrial fibrillation) (Multi) (03/05/2024).    Surgical History  He has a past surgical history that includes Other surgical history (11/18/2022).     Social History  He reports that he has never smoked. He has never used smokeless tobacco. He reports that he does not drink alcohol and does not use drugs.    Family History  Family History   Problem Relation Name Age of Onset    Hypertension Mother      Hyperlipidemia Mother      Heart attack Mother      Hypertension Father      Hyperlipidemia Father      Heart attack Father         Medications  Current Outpatient Medications   Medication Instructions    aspirin 81 mg, Daily    BD Gina 2nd Gen Pen Needle 32 gauge x 5/32\" needle Twice daily    Eliquis 5 mg, oral, 2 times daily    HumaLOG Mix 75-25 KwikPen 100 unit/mL (75-25) injection 28 Units, subcutaneous, 2 times daily (morning and late afternoon)    Jardiance 25 mg, oral, Daily    ketoconazole (NIZOral) 2 % shampoo Topical, 3 times weekly    ketoconazole-iodoquinoL-hc 2-1-2.5 % cream Apply a thin layer to the affect area in the morning and at night    lisinopriL-hydrochlorothiazide 20-25 mg tablet 1 tablet, oral, Daily    metFORMIN (GLUCOPHAGE) 850 mg, oral, 2 times daily    OneTouch Delica Plus Lancet 30 gauge misc FOR GLUCOSE TESTING 3 TIMES DAILY    OneTouch Verio Flex meter misc " "FOR GLUCOSE TESTING 3 TIMES DAILY    OneTouch Verio test strips strip TEST 3 TIMES DAILY.    pen needle, diabetic (BD Gina 2nd Gen Pen Needle) 32 gauge x 5/32\" needle USE AS DIRECTED    simvastatin (ZOCOR) 40 mg, oral, Nightly       Allergies  Patient has no known allergies.    Review of Systems   Constitutional:  Negative for appetite change and fever.   HENT:  Negative for sore throat.         Denies dry mouth   Eyes:  Negative for visual disturbance.   Respiratory:  Negative for cough and shortness of breath.    Cardiovascular:  Negative for chest pain.   Gastrointestinal:  Negative for abdominal pain, constipation, diarrhea, nausea and vomiting.   Endocrine: Negative for polydipsia and polyuria.   Genitourinary:  Negative for frequency.   Musculoskeletal:  Negative for arthralgias.   Skin:  Negative for rash.        Dry   Neurological:  Negative for headaches.   Psychiatric/Behavioral:  The patient is not nervous/anxious.          Last Recorded Vitals  Blood pressure (!) 183/81, pulse 55, weight 107 kg (236 lb 5.3 oz).    Physical Exam  Constitutional:       General: He is not in acute distress.  HENT:      Head: Normocephalic.      Mouth/Throat:      Mouth: Mucous membranes are moist.   Eyes:      Extraocular Movements: Extraocular movements intact.   Neck:      Thyroid: No thyroid mass or thyromegaly.   Cardiovascular:      Pulses:           Radial pulses are 2+ on the right side and 2+ on the left side.   Musculoskeletal:      Right lower leg: No edema.      Left lower leg: No edema.   Feet:      Comments: Flat feet  Lymphadenopathy:      Cervical: No cervical adenopathy.   Skin:     Comments: No foot sores   Neurological:      Mental Status: He is alert.      Motor: No tremor.   Psychiatric:         Mood and Affect: Affect normal.          Relevant Results  GLUCOSE (mg/dL)   Date Value   02/25/2025 97     Glucose (mg/dL)   Date Value   08/21/2024 104 (H)   02/21/2024 91   10/17/2023 135 (H)     HEMOGLOBIN " A1c (% of total Hgb)   Date Value   02/25/2025 6.5 (H)     Hemoglobin A1C (%)   Date Value   08/21/2024 7.0 (H)   04/25/2024 6.6 (H)   01/18/2024 7.0 (H)     CARBON DIOXIDE (mmol/L)   Date Value   02/25/2025 30     Bicarbonate (mmol/L)   Date Value   08/21/2024 28   02/21/2024 29   10/17/2023 26     UREA NITROGEN (BUN) (mg/dL)   Date Value   02/25/2025 25     Urea Nitrogen (mg/dL)   Date Value   08/21/2024 29 (H)   02/21/2024 28 (H)   10/17/2023 31 (H)     Creatinine (mg/dL)   Date Value   08/21/2024 1.48 (H)   02/21/2024 1.28   10/17/2023 1.37 (H)     CREATININE (mg/dL)   Date Value   02/25/2025 1.37 (H)     Lab Results   Component Value Date    CHOL 111 02/25/2025    CHOL 104 08/21/2024    CHOL 104 02/21/2024     Lab Results   Component Value Date    HDL 43 02/25/2025    HDL 46.4 08/21/2024    HDL 48.0 02/21/2024     Lab Results   Component Value Date    LDLCALC 53 02/25/2025    LDLCALC 43 08/21/2024    LDLCALC 43 02/21/2024     Lab Results   Component Value Date    TRIG 70 02/25/2025    TRIG 75 08/21/2024    TRIG 67 02/21/2024       CGM INTERPRETATION:  Patient is adhering to and benefitting from continuous glucose monitoring.   Model: FreeStyle Neyda 2  Period reviewed:  14 days  Testing glucose 288 times daily    Average blood sugar 140 mg/dL, glucose management indicator 6.7%    Glucose less than 70 mg/dL equals 2% of time worn  Glucose ranging between 70 to 180 mg/dL represented by 77% of time worn  Glucose ranging greater than 180 mg/dL represented by 21% of time worn    >72 hours of data reviewed in order to inform diabetes treatment plan decision making, patient currently at risk for recurrent hypoglycemia safety concerns      IMPRESSION  TYPE 2 DIABETES MELLITUS   LONG TERM CURRENT INSULIN USE   Glucose well controlled    RECOMMENDATIONS  Continue current program    Follow up 6 months  Labs as ordered by Dr. Pletikosic  Bring written glucose record (2 weeks' worth) to all appointments.

## 2025-03-21 ENCOUNTER — OFFICE VISIT (OUTPATIENT)
Dept: PRIMARY CARE | Facility: CLINIC | Age: 80
End: 2025-03-21
Payer: MEDICARE

## 2025-03-21 VITALS — SYSTOLIC BLOOD PRESSURE: 122 MMHG | OXYGEN SATURATION: 97 % | HEART RATE: 69 BPM | DIASTOLIC BLOOD PRESSURE: 74 MMHG

## 2025-03-21 DIAGNOSIS — I10 PRIMARY HYPERTENSION: ICD-10-CM

## 2025-03-21 PROCEDURE — G2211 COMPLEX E/M VISIT ADD ON: HCPCS | Performed by: STUDENT IN AN ORGANIZED HEALTH CARE EDUCATION/TRAINING PROGRAM

## 2025-03-21 PROCEDURE — 99214 OFFICE O/P EST MOD 30 MIN: CPT | Performed by: STUDENT IN AN ORGANIZED HEALTH CARE EDUCATION/TRAINING PROGRAM

## 2025-03-21 PROCEDURE — 3074F SYST BP LT 130 MM HG: CPT | Performed by: STUDENT IN AN ORGANIZED HEALTH CARE EDUCATION/TRAINING PROGRAM

## 2025-03-21 PROCEDURE — 1036F TOBACCO NON-USER: CPT | Performed by: STUDENT IN AN ORGANIZED HEALTH CARE EDUCATION/TRAINING PROGRAM

## 2025-03-21 PROCEDURE — 3078F DIAST BP <80 MM HG: CPT | Performed by: STUDENT IN AN ORGANIZED HEALTH CARE EDUCATION/TRAINING PROGRAM

## 2025-03-21 PROCEDURE — 1159F MED LIST DOCD IN RCRD: CPT | Performed by: STUDENT IN AN ORGANIZED HEALTH CARE EDUCATION/TRAINING PROGRAM

## 2025-03-21 RX ORDER — LISINOPRIL 10 MG/1
10 TABLET ORAL DAILY
Qty: 90 TABLET | Refills: 1 | Status: SHIPPED | OUTPATIENT
Start: 2025-03-21

## 2025-03-21 NOTE — PROGRESS NOTES
Subjective   Patient ID: TUNDE Khanna is a 79 y.o. male who presents for Hypertension.    HPI   Patient is presenting to office today to follow-up acutely for elevated blood pressure readings    He recently went to his endocrinology appointment and noted that he had a very elevated reading above 170 systolic  Denied any cardiopulmonary symptoms but ultimately has been now checking his blood pressures at home and consistently stays over 140 systolic    Blood pressure prior to this was regularly around 130 systolic    At his last appointment, 1 month ago, his blood pressure was on the low end of normal and we did decrease his lisinopril down to 20 mg total    Now asking if we can increase back up to 30 mg if medically appropriate    Review of Systems  All pertinent positive symptoms are included in the history of present illness.    All other systems have been reviewed and are negative and noncontributory to this patient's current ailments.    Objective   /74 (BP Location: Left arm, Patient Position: Sitting, BP Cuff Size: Adult)   Pulse 69   SpO2 97%     Physical Exam  CONSTITUTIONAL - well nourished, well developed, looks like stated age, in no acute distress, not ill-appearing, and not tired appearing  SKIN - normal skin color and pigmentation, normal skin turgor without rash, lesions, or nodules visualized  HEAD - no trauma, normocephalic  EYES - normal external exam  CHEST -no distressed breathing, good effort  EXTREMITIES - no edema, no deformities  NEUROLOGICAL - normal balance, normal motor, no ataxia  PSYCHIATRIC - alert, pleasant and cordial, age-appropriate    Assessment/Plan   We had a conversation about your elevated blood pressure readings and I did add on a lisinopril 10 mg to take alongside your other medications    I would like to have you monitor and record blood pressures at home  Blood pressure goal should be below 130/80, ideally 120/80    Please follow-up in the office within the next 3-4  weeks so we can continue close care  Lastly, please bring in your blood pressure cuff so we can see the accuracy    At any point if you notice your blood pressure spikes and you notice any cardiopulmonary symptoms, please contact me immediately and/or go to nearest emergency room

## 2025-04-05 DIAGNOSIS — Z79.4 TYPE 2 DIABETES MELLITUS WITHOUT COMPLICATION, WITH LONG-TERM CURRENT USE OF INSULIN: ICD-10-CM

## 2025-04-05 DIAGNOSIS — E11.9 TYPE 2 DIABETES MELLITUS WITHOUT COMPLICATION, WITH LONG-TERM CURRENT USE OF INSULIN: ICD-10-CM

## 2025-04-06 RX ORDER — EMPAGLIFLOZIN 25 MG/1
25 TABLET, FILM COATED ORAL DAILY
Qty: 30 TABLET | Refills: 11 | Status: SHIPPED | OUTPATIENT
Start: 2025-04-06

## 2025-04-11 ENCOUNTER — APPOINTMENT (OUTPATIENT)
Dept: PRIMARY CARE | Facility: CLINIC | Age: 80
End: 2025-04-11
Payer: MEDICARE

## 2025-04-11 VITALS — SYSTOLIC BLOOD PRESSURE: 160 MMHG | DIASTOLIC BLOOD PRESSURE: 60 MMHG | OXYGEN SATURATION: 96 % | HEART RATE: 62 BPM

## 2025-04-11 DIAGNOSIS — I10 PRIMARY HYPERTENSION: Primary | ICD-10-CM

## 2025-04-11 DIAGNOSIS — I48.20 CHRONIC ATRIAL FIBRILLATION (MULTI): ICD-10-CM

## 2025-04-11 PROCEDURE — 99213 OFFICE O/P EST LOW 20 MIN: CPT | Performed by: STUDENT IN AN ORGANIZED HEALTH CARE EDUCATION/TRAINING PROGRAM

## 2025-04-11 PROCEDURE — G2211 COMPLEX E/M VISIT ADD ON: HCPCS | Performed by: STUDENT IN AN ORGANIZED HEALTH CARE EDUCATION/TRAINING PROGRAM

## 2025-04-11 PROCEDURE — 1036F TOBACCO NON-USER: CPT | Performed by: STUDENT IN AN ORGANIZED HEALTH CARE EDUCATION/TRAINING PROGRAM

## 2025-04-11 PROCEDURE — 3078F DIAST BP <80 MM HG: CPT | Performed by: STUDENT IN AN ORGANIZED HEALTH CARE EDUCATION/TRAINING PROGRAM

## 2025-04-11 PROCEDURE — 3077F SYST BP >= 140 MM HG: CPT | Performed by: STUDENT IN AN ORGANIZED HEALTH CARE EDUCATION/TRAINING PROGRAM

## 2025-04-11 PROCEDURE — 1159F MED LIST DOCD IN RCRD: CPT | Performed by: STUDENT IN AN ORGANIZED HEALTH CARE EDUCATION/TRAINING PROGRAM

## 2025-04-11 NOTE — PROGRESS NOTES
Subjective   Patient ID: TUNDE Khanna is a 79 y.o. male who presents for Hypertension.    HPI   Patient is a 79 years old male with past medical history of coronary artery disease status post PCI, persistent atrial fibrillation on Eliquis and ASA, hyperlipidemia, diabetes, CKD and hypertension.  He presents today in office as a follow-up of his recently uncontrolled blood pressure readings.  3 weeks ago he was seen in the office with concerns of elevated blood pressure and the dose of lisinopril was increased from 20 mg to 30 mg daily to be taken alongside hydrochlorothiazide 25 mg daily.  He is also on Jardiance 25 mg daily for his diabetes  He has been compliant with medication regimen and denies any cardiovascular symptoms including chest pain or pressure, shortness of breath, headaches or lightheadedness.  Admit that his physical activity and exercise has significant decreased during the past year.  Plan to go back to the gym and exercise more regularly.  He has a wrist blood pressure cuff.  Blood pressure at triage was 160/60, compared to his blood pressure cuff was 172/89.  Rechecked by me, 10 minutes later, blood pressure was 144/ 68    Review of Systems  All pertinent positive symptoms are included in the history of present illness.    All other systems have been reviewed and are negative and noncontributory to this patient's current ailments.    Objective   /60 (BP Location: Left arm, Patient Position: Sitting, BP Cuff Size: Large adult)   Pulse 62   SpO2 96%     Physical Exam  Constitutional:       General: He is not in acute distress.     Appearance: Normal appearance. He is normal weight. He is not ill-appearing.   HENT:      Head: Normocephalic and atraumatic.      Nose: Nose normal. No congestion or rhinorrhea.      Mouth/Throat:      Mouth: Mucous membranes are moist.      Pharynx: Oropharynx is clear. No oropharyngeal exudate or posterior oropharyngeal erythema.   Eyes:      General: No scleral  icterus.     Extraocular Movements: Extraocular movements intact.      Conjunctiva/sclera: Conjunctivae normal.      Pupils: Pupils are equal, round, and reactive to light.   Cardiovascular:      Rate and Rhythm: Normal rate. Rhythm irregularly irregular. No extrasystoles are present.     Pulses: Normal pulses.      Heart sounds: Normal heart sounds. No murmur heard.  Pulmonary:      Effort: Pulmonary effort is normal. No respiratory distress.      Breath sounds: Normal breath sounds. No wheezing, rhonchi or rales.   Abdominal:      General: Abdomen is flat. Bowel sounds are normal.      Palpations: Abdomen is soft.      Tenderness: There is no abdominal tenderness. There is no guarding or rebound.      Hernia: No hernia is present.   Musculoskeletal:         General: Normal range of motion.      Right lower leg: No edema.      Left lower leg: No edema.   Skin:     General: Skin is warm.      Capillary Refill: Capillary refill takes less than 2 seconds.      Findings: No lesion or rash.   Neurological:      General: No focal deficit present.      Mental Status: He is alert and oriented to person, place, and time.   Psychiatric:         Mood and Affect: Mood normal.         Behavior: Behavior normal.         Assessment/Plan   Diagnoses and all orders for this visit:  Primary hypertension  Chronic atrial fibrillation (Multi)  Blood pressure shows stability on the higher side of normal, and patient is asymptomatic.  Discussed in length about improving his diet, controlling sodium intake and staying hydrated, as well as increasing physical activity and exercising more frequently.  Agreed to continue current regimen of blood pressure treatment.  Recommend switching to a cubital blood pressure cuff  Instructed to monitor and record blood pressures at home  Blood pressure goal should be below 130/80, ideally 120/80  Instructed that if at any moment patient would have any worrisome symptoms including chest pain or pressure,  shortness of breath, lightheadedness, or visual disturbance to call EMS or present to the emergency department.    Anne Kevin MD   Resident, PGY2

## 2025-06-03 ENCOUNTER — APPOINTMENT (OUTPATIENT)
Dept: PRIMARY CARE | Facility: CLINIC | Age: 80
End: 2025-06-03
Payer: MEDICARE

## 2025-06-03 ENCOUNTER — TELEPHONE (OUTPATIENT)
Dept: CARDIOLOGY | Facility: CLINIC | Age: 80
End: 2025-06-03

## 2025-06-03 VITALS
BODY MASS INDEX: 31.4 KG/M2 | DIASTOLIC BLOOD PRESSURE: 80 MMHG | HEART RATE: 61 BPM | OXYGEN SATURATION: 99 % | WEIGHT: 238 LBS | SYSTOLIC BLOOD PRESSURE: 178 MMHG

## 2025-06-03 DIAGNOSIS — M79.89 LEG SWELLING: ICD-10-CM

## 2025-06-03 DIAGNOSIS — E11.9 TYPE 2 DIABETES MELLITUS WITHOUT COMPLICATION, WITH LONG-TERM CURRENT USE OF INSULIN: ICD-10-CM

## 2025-06-03 DIAGNOSIS — I48.91 ATRIAL FIBRILLATION, UNSPECIFIED TYPE (MULTI): ICD-10-CM

## 2025-06-03 DIAGNOSIS — R60.0 BILATERAL EDEMA OF LOWER EXTREMITY: Primary | ICD-10-CM

## 2025-06-03 DIAGNOSIS — Z79.4 TYPE 2 DIABETES MELLITUS WITHOUT COMPLICATION, WITH LONG-TERM CURRENT USE OF INSULIN: ICD-10-CM

## 2025-06-03 DIAGNOSIS — I10 PRIMARY HYPERTENSION: Primary | ICD-10-CM

## 2025-06-03 DIAGNOSIS — I13.0 HYPERTENSIVE HEART AND CHRONIC KIDNEY DISEASE WITH HEART FAILURE AND STAGE 1 THROUGH STAGE 4 CHRONIC KIDNEY DISEASE, OR UNSPECIFIED CHRONIC KIDNEY DISEASE: ICD-10-CM

## 2025-06-03 DIAGNOSIS — N18.30 STAGE 3 CHRONIC KIDNEY DISEASE, UNSPECIFIED WHETHER STAGE 3A OR 3B CKD (MULTI): ICD-10-CM

## 2025-06-03 PROCEDURE — 3077F SYST BP >= 140 MM HG: CPT | Performed by: STUDENT IN AN ORGANIZED HEALTH CARE EDUCATION/TRAINING PROGRAM

## 2025-06-03 PROCEDURE — 1036F TOBACCO NON-USER: CPT | Performed by: STUDENT IN AN ORGANIZED HEALTH CARE EDUCATION/TRAINING PROGRAM

## 2025-06-03 PROCEDURE — 99214 OFFICE O/P EST MOD 30 MIN: CPT | Performed by: STUDENT IN AN ORGANIZED HEALTH CARE EDUCATION/TRAINING PROGRAM

## 2025-06-03 PROCEDURE — G2211 COMPLEX E/M VISIT ADD ON: HCPCS | Performed by: STUDENT IN AN ORGANIZED HEALTH CARE EDUCATION/TRAINING PROGRAM

## 2025-06-03 PROCEDURE — 1159F MED LIST DOCD IN RCRD: CPT | Performed by: STUDENT IN AN ORGANIZED HEALTH CARE EDUCATION/TRAINING PROGRAM

## 2025-06-03 PROCEDURE — 3079F DIAST BP 80-89 MM HG: CPT | Performed by: STUDENT IN AN ORGANIZED HEALTH CARE EDUCATION/TRAINING PROGRAM

## 2025-06-03 RX ORDER — LISINOPRIL 20 MG/1
20 TABLET ORAL DAILY
Qty: 90 TABLET | Refills: 0 | Status: SHIPPED | OUTPATIENT
Start: 2025-06-03

## 2025-06-03 NOTE — PROGRESS NOTES
Subjective   Patient ID: TUNDE Khanna is a 79 y.o. male who presents for Bilateral Feet Swelling and Hypertension.    HPI   Patient is a 79 years old male with past medical history of coronary artery disease status post PCI, persistent atrial fibrillation on Eliquis and ASA, hyperlipidemia, diabetes, CKD and hypertension.  He presents today in office as a follow-up of his recently uncontrolled blood pressure readings.  He was seen in the office 3/21/2025 with concerns of elevated blood pressure and the dose of lisinopril was increased from 20 mg to 30 mg daily to be taken alongside hydrochlorothiazide 25 mg daily.  He is also on Jardiance 25 mg daily for his diabetes  He has been compliant with medication regimen and denies any cardiovascular symptoms including chest pain or pressure, shortness of breath, headaches or lightheadedness.  Admit that his physical activity and exercise has significant decreased during the past year.  Plan to go back to the gym and exercise more regularly.  He has a wrist blood pressure cuff.    He was seen for follow up on 4/11 where is blood pressure at triage was 160/60, compared to his blood pressure cuff was 172/89.  Rechecked 10 minutes later, blood pressure was 144/68. At that time she had Medical Decision Making was done patient was advised to continue his current regimen with strict lifestyle modifications controlling his sodium intake and improving his diet.    Today, patient presents with lower extremity swelling that started 1 week ago, as well as elevated blood pressure readings at home running from 150s-180s/60s-90s  Blood pressure in office today 178/80, on recheck at 182/70.  Does have a history of LE swelling in the past which improved after stopping his amlodipine.  His current blood pressure regimen is lisinopril 30 mg daily alongside hydrochlorothiazide 25 mg daily.            Review of Systems  All pertinent positive symptoms are included in the history of present  "illness.    All other systems have been reviewed and are negative and noncontributory to this patient's current ailments.    Objective   /80 (BP Location: Left arm, Patient Position: Sitting, BP Cuff Size: Large adult)   Pulse 61   Wt 108 kg (238 lb)   SpO2 99%   BMI 31.40 kg/m²     Physical Exam  Constitutional:       General: He is not in acute distress.     Appearance: Normal appearance. He is normal weight. He is not ill-appearing.   HENT:      Head: Normocephalic and atraumatic.      Nose: Nose normal. No congestion or rhinorrhea.      Mouth/Throat:      Mouth: Mucous membranes are moist.   Eyes:      General: No scleral icterus.     Extraocular Movements: Extraocular movements intact.      Conjunctiva/sclera: Conjunctivae normal.   Cardiovascular:      Rate and Rhythm: Normal rate. Rhythm irregular. No extrasystoles are present.     Pulses: Normal pulses.      Heart sounds: Normal heart sounds. No murmur heard.  Pulmonary:      Effort: Pulmonary effort is normal. No respiratory distress.      Breath sounds: Normal breath sounds. No wheezing, rhonchi or rales.   Abdominal:      General: Abdomen is flat. Bowel sounds are normal.      Palpations: Abdomen is soft.   Musculoskeletal:         General: Normal range of motion.      Right lower le+ Pitting Edema present.      Left lower le+ Pitting Edema (Increased on left side) present.   Skin:     General: Skin is warm.      Capillary Refill: Capillary refill takes less than 2 seconds.      Findings: No lesion or rash.   Neurological:      General: No focal deficit present.      Mental Status: He is alert and oriented to person, place, and time.   Psychiatric:         Mood and Affect: Mood normal.         Behavior: Behavior normal.         Assessment/Plan     Edward \"ED\" was seen today for bilateral feet swelling and hypertension.  Diagnoses and all orders for this visit:  Primary hypertension (Primary)  Stage 3 chronic kidney disease, unspecified " whether stage 3a or 3b CKD (Multi)  Atrial fibrillation, unspecified type (Multi)  Type 2 diabetes mellitus without complication, with long-term current use of insulin  Leg swelling    Blood pressure unstable, recommend increasing lisinopril to 40 mg daily, continue with hydrochlorothiazide 25 mg daily.   In addition we also ordered a DVT ultrasound to rule out any DVTs as he do have persistent A-fib and increased lower extremity swelling on your left side.  Due to your history with chronic kidney disease, we have also placed a referral for nephrology.  We will consider Lasix for your lower extremity swelling if not improved with blood pressure control and pending your blood work.  In addition, please follow-up with your cardiologist, Dr. Russell for better control of your hypertension and to ensure continued follow-up as Dr. Lombardo is leaving his practice.    Goal blood pressure is <130/80, ideally 120/80. I recommend monitoring your blood pressure at home so you can bring these readings to your next visit.   I recommend a low salt, well balanced diet, free from processed foods and regular physical activity in order to get your blood pressure down naturally.  Discussed in length about improving his diet, controlling sodium intake and staying hydrated, as well as increasing physical activity and exercising more frequently.  Instructed that if at any moment patient would have any worrisome symptoms including chest pain or pressure, shortness of breath, lightheadedness, or visual disturbance to call EMS or present to the emergency department.    Please follow-up in 4 weeks for blood pressure check.

## 2025-06-04 ENCOUNTER — HOSPITAL ENCOUNTER (OUTPATIENT)
Dept: VASCULAR MEDICINE | Facility: CLINIC | Age: 80
Discharge: HOME | End: 2025-06-04
Payer: MEDICARE

## 2025-06-04 DIAGNOSIS — M79.89 LEG SWELLING: ICD-10-CM

## 2025-06-04 DIAGNOSIS — R60.0 LOCALIZED EDEMA: ICD-10-CM

## 2025-06-04 LAB
ALBUMIN SERPL-MCNC: 4.2 G/DL (ref 3.6–5.1)
ALP SERPL-CCNC: 89 U/L (ref 35–144)
ALT SERPL-CCNC: 15 U/L (ref 9–46)
ANION GAP SERPL CALCULATED.4IONS-SCNC: 12 MMOL/L (CALC) (ref 7–17)
AST SERPL-CCNC: 14 U/L (ref 10–35)
BILIRUB SERPL-MCNC: 0.6 MG/DL (ref 0.2–1.2)
BNP SERPL-MCNC: 283 PG/ML
BUN SERPL-MCNC: 33 MG/DL (ref 7–25)
CALCIUM SERPL-MCNC: 9.3 MG/DL (ref 8.6–10.3)
CHLORIDE SERPL-SCNC: 105 MMOL/L (ref 98–110)
CO2 SERPL-SCNC: 26 MMOL/L (ref 20–32)
CREAT SERPL-MCNC: 1.56 MG/DL (ref 0.7–1.28)
EGFRCR SERPLBLD CKD-EPI 2021: 45 ML/MIN/1.73M2
GLUCOSE SERPL-MCNC: 125 MG/DL (ref 65–99)
POTASSIUM SERPL-SCNC: 5 MMOL/L (ref 3.5–5.3)
PROT SERPL-MCNC: 7.6 G/DL (ref 6.1–8.1)
SODIUM SERPL-SCNC: 143 MMOL/L (ref 135–146)

## 2025-06-04 PROCEDURE — 93970 EXTREMITY STUDY: CPT

## 2025-06-04 PROCEDURE — 93970 EXTREMITY STUDY: CPT | Performed by: SURGERY

## 2025-06-04 RX ORDER — TORSEMIDE 20 MG/1
20 TABLET ORAL DAILY
Qty: 30 TABLET | Refills: 0 | Status: SHIPPED | OUTPATIENT
Start: 2025-06-04 | End: 2025-07-04

## 2025-06-04 NOTE — LETTER
"March 20, 2025     Faviola Lombardo DO  [address]  [facility], [address]  [facility] [address]    Patient: TUNDE Khanna   YOB: 1945   Date of Visit: 3/20/2025       Dear Dr. Faviola Lombardo DO:    Thank you for referring ED Jey to me for evaluation. Below are my notes for this consultation.  If you have questions, please do not hesitate to call me. I look forward to following your patient along with you.       Sincerely,     Nitin Novak MD      CC: No Recipients  ______________________________________________________________________________________    History Of Present Illness  Sean Khanna \"ED\" is a 79 y.o. male     Duration of type 2 diabetes mellitus:  7 years  Complications:  Coronary artery disease      Humalog Mix 75/25  Breakfast 28 units  Dinner 28 units     Jardiance 25 mg/day  Metformin 850 mg BID     FreeStyle Neyda 2  Patient is testing glucose 288 times daily  Records reviewed, on file     Last eye exam:  July 2024       Past Medical History  He has a past medical history of Coronary artery disease involving native coronary artery of native heart without angina pectoris (03/05/2024), Longstanding persistent atrial fibrillation (Multi) (03/05/2024), and PAF (paroxysmal atrial fibrillation) (Multi) (03/05/2024).    Surgical History  He has a past surgical history that includes Other surgical history (11/18/2022).     Social History  He reports that he has never smoked. He has never used smokeless tobacco. He reports that he does not drink alcohol and does not use drugs.    Family History  Family History   Problem Relation Name Age of Onset   • Hypertension Mother     • Hyperlipidemia Mother     • Heart attack Mother     • Hypertension Father     • Hyperlipidemia Father     • Heart attack Father         Medications  Current Outpatient Medications   Medication Instructions   • aspirin 81 mg, Daily   • BD Gina 2nd Gen Pen Needle 32 gauge x 5/32\" needle Twice " "daily   • Eliquis 5 mg, oral, 2 times daily   • HumaLOG Mix 75-25 KwikPen 100 unit/mL (75-25) injection 28 Units, subcutaneous, 2 times daily (morning and late afternoon)   • Jardiance 25 mg, oral, Daily   • ketoconazole (NIZOral) 2 % shampoo Topical, 3 times weekly   • ketoconazole-iodoquinoL-hc 2-1-2.5 % cream Apply a thin layer to the affect area in the morning and at night   • lisinopriL-hydrochlorothiazide 20-25 mg tablet 1 tablet, oral, Daily   • metFORMIN (GLUCOPHAGE) 850 mg, oral, 2 times daily   • OneTouch Delica Plus Lancet 30 gauge misc FOR GLUCOSE TESTING 3 TIMES DAILY   • OneTouch Verio Flex meter misc FOR GLUCOSE TESTING 3 TIMES DAILY   • OneTouch Verio test strips strip TEST 3 TIMES DAILY.   • pen needle, diabetic (BD Gina 2nd Gen Pen Needle) 32 gauge x 5/32\" needle USE AS DIRECTED   • simvastatin (ZOCOR) 40 mg, oral, Nightly       Allergies  Patient has no known allergies.    Review of Systems   Constitutional:  Negative for appetite change and fever.   HENT:  Negative for sore throat.         Denies dry mouth   Eyes:  Negative for visual disturbance.   Respiratory:  Negative for cough and shortness of breath.    Cardiovascular:  Negative for chest pain.   Gastrointestinal:  Negative for abdominal pain, constipation, diarrhea, nausea and vomiting.   Endocrine: Negative for polydipsia and polyuria.   Genitourinary:  Negative for frequency.   Musculoskeletal:  Negative for arthralgias.   Skin:  Negative for rash.        Dry   Neurological:  Negative for headaches.   Psychiatric/Behavioral:  The patient is not nervous/anxious.          Last Recorded Vitals  Blood pressure (!) 183/81, pulse 55, weight 107 kg (236 lb 5.3 oz).    Physical Exam  Constitutional:       General: He is not in acute distress.  HENT:      Head: Normocephalic.      Mouth/Throat:      Mouth: Mucous membranes are moist.   Eyes:      Extraocular Movements: Extraocular movements intact.   Neck:      Thyroid: No thyroid mass or " thyromegaly.   Cardiovascular:      Pulses:           Radial pulses are 2+ on the right side and 2+ on the left side.   Musculoskeletal:      Right lower leg: No edema.      Left lower leg: No edema.   Feet:      Comments: Flat feet  Lymphadenopathy:      Cervical: No cervical adenopathy.   Skin:     Comments: No foot sores   Neurological:      Mental Status: He is alert.      Motor: No tremor.   Psychiatric:         Mood and Affect: Affect normal.          Relevant Results  GLUCOSE (mg/dL)   Date Value   02/25/2025 97     Glucose (mg/dL)   Date Value   08/21/2024 104 (H)   02/21/2024 91   10/17/2023 135 (H)     HEMOGLOBIN A1c (% of total Hgb)   Date Value   02/25/2025 6.5 (H)     Hemoglobin A1C (%)   Date Value   08/21/2024 7.0 (H)   04/25/2024 6.6 (H)   01/18/2024 7.0 (H)     CARBON DIOXIDE (mmol/L)   Date Value   02/25/2025 30     Bicarbonate (mmol/L)   Date Value   08/21/2024 28   02/21/2024 29   10/17/2023 26     UREA NITROGEN (BUN) (mg/dL)   Date Value   02/25/2025 25     Urea Nitrogen (mg/dL)   Date Value   08/21/2024 29 (H)   02/21/2024 28 (H)   10/17/2023 31 (H)     Creatinine (mg/dL)   Date Value   08/21/2024 1.48 (H)   02/21/2024 1.28   10/17/2023 1.37 (H)     CREATININE (mg/dL)   Date Value   02/25/2025 1.37 (H)     Lab Results   Component Value Date    CHOL 111 02/25/2025    CHOL 104 08/21/2024    CHOL 104 02/21/2024     Lab Results   Component Value Date    HDL 43 02/25/2025    HDL 46.4 08/21/2024    HDL 48.0 02/21/2024     Lab Results   Component Value Date    LDLCALC 53 02/25/2025    LDLCALC 43 08/21/2024    LDLCALC 43 02/21/2024     Lab Results   Component Value Date    TRIG 70 02/25/2025    TRIG 75 08/21/2024    TRIG 67 02/21/2024       CGM INTERPRETATION:  Patient is adhering to and benefitting from continuous glucose monitoring.   Model: FreeStyle Neyda 2  Period reviewed:  14 days  Testing glucose 288 times daily    Average blood sugar 140 mg/dL, glucose management indicator 6.7%    Glucose less  than 70 mg/dL equals 2% of time worn  Glucose ranging between 70 to 180 mg/dL represented by 77% of time worn  Glucose ranging greater than 180 mg/dL represented by 21% of time worn    >72 hours of data reviewed in order to inform diabetes treatment plan decision making, patient currently at risk for recurrent hypoglycemia safety concerns      IMPRESSION  TYPE 2 DIABETES MELLITUS   LONG TERM CURRENT INSULIN USE   Glucose well controlled    RECOMMENDATIONS  Continue current program    Follow up 6 months  Labs as ordered by Dr. Lombardo  Bring written glucose record (2 weeks' worth) to all appointments.               [Negative] : Genitourinary

## 2025-06-04 NOTE — RESULT ENCOUNTER NOTE
Ultrasound of lower extremity shows no evidence of any DVT/clots   Oxybutynin Counseling:  I discussed with the patient the risks of oxybutynin including but not limited to skin rash, drowsiness, dry mouth, difficulty urinating, and blurred vision.

## 2025-06-04 NOTE — RESULT ENCOUNTER NOTE
Kidney function is decreased at 45% as well as noted BUN/creatinine being slightly elevated but this overall is stable    His BNP is noted to be slightly elevated at 283 which could be pointing towards some CHF    Usually the cutoff is above 500 or even higher so it is a very low likelihood that this could be from CHF    I would still recommend that he follow-up with his cardiology provider to discuss this further

## 2025-06-10 ENCOUNTER — APPOINTMENT (OUTPATIENT)
Dept: PRIMARY CARE | Facility: CLINIC | Age: 80
End: 2025-06-10
Payer: MEDICARE

## 2025-06-11 ENCOUNTER — APPOINTMENT (OUTPATIENT)
Dept: PRIMARY CARE | Facility: CLINIC | Age: 80
End: 2025-06-11
Payer: MEDICARE

## 2025-06-11 VITALS
DIASTOLIC BLOOD PRESSURE: 80 MMHG | HEART RATE: 67 BPM | WEIGHT: 230 LBS | HEIGHT: 73 IN | BODY MASS INDEX: 30.48 KG/M2 | SYSTOLIC BLOOD PRESSURE: 128 MMHG

## 2025-06-11 DIAGNOSIS — R60.0 BILATERAL EDEMA OF LOWER EXTREMITY: ICD-10-CM

## 2025-06-11 DIAGNOSIS — I10 PRIMARY HYPERTENSION: Primary | ICD-10-CM

## 2025-06-11 DIAGNOSIS — E11.9 TYPE 2 DIABETES MELLITUS WITHOUT COMPLICATION, WITH LONG-TERM CURRENT USE OF INSULIN: ICD-10-CM

## 2025-06-11 DIAGNOSIS — I48.91 ATRIAL FIBRILLATION, UNSPECIFIED TYPE (MULTI): ICD-10-CM

## 2025-06-11 DIAGNOSIS — N18.30 STAGE 3 CHRONIC KIDNEY DISEASE, UNSPECIFIED WHETHER STAGE 3A OR 3B CKD (MULTI): ICD-10-CM

## 2025-06-11 DIAGNOSIS — I13.0 HYPERTENSIVE HEART AND CHRONIC KIDNEY DISEASE WITH HEART FAILURE AND STAGE 1 THROUGH STAGE 4 CHRONIC KIDNEY DISEASE, OR UNSPECIFIED CHRONIC KIDNEY DISEASE: ICD-10-CM

## 2025-06-11 DIAGNOSIS — Z79.4 TYPE 2 DIABETES MELLITUS WITHOUT COMPLICATION, WITH LONG-TERM CURRENT USE OF INSULIN: ICD-10-CM

## 2025-06-11 PROCEDURE — G2211 COMPLEX E/M VISIT ADD ON: HCPCS | Performed by: STUDENT IN AN ORGANIZED HEALTH CARE EDUCATION/TRAINING PROGRAM

## 2025-06-11 PROCEDURE — 1036F TOBACCO NON-USER: CPT | Performed by: STUDENT IN AN ORGANIZED HEALTH CARE EDUCATION/TRAINING PROGRAM

## 2025-06-11 PROCEDURE — 3079F DIAST BP 80-89 MM HG: CPT | Performed by: STUDENT IN AN ORGANIZED HEALTH CARE EDUCATION/TRAINING PROGRAM

## 2025-06-11 PROCEDURE — 99213 OFFICE O/P EST LOW 20 MIN: CPT | Performed by: STUDENT IN AN ORGANIZED HEALTH CARE EDUCATION/TRAINING PROGRAM

## 2025-06-11 PROCEDURE — 3074F SYST BP LT 130 MM HG: CPT | Performed by: STUDENT IN AN ORGANIZED HEALTH CARE EDUCATION/TRAINING PROGRAM

## 2025-06-11 ASSESSMENT — ENCOUNTER SYMPTOMS: HYPERTENSION: 1

## 2025-06-11 NOTE — PROGRESS NOTES
"Subjective   Patient ID: TUNDE Khanna is a 79 y.o. male who presents for Hypertension.    Hypertension       Patient is a 79 years old male with past medical history of coronary artery disease status post PCI, persistent atrial fibrillation on Eliquis and ASA, hyperlipidemia, diabetes, CKD and hypertension.  He presents today in office as a follow-up of his recently uncontrolled blood pressure readings.  He was seen in the office 6/3/2025 with concerns of elevated blood pressure and the dose of lisinopril was increased from 30 mg to 40 mg daily to be taken alongside hydrochlorothiazide 25 mg daily.  He is also on Jardiance 25 mg daily for his diabetes.  Lab work at the time showed GFR 45% which was overall stable, but BNP was elevated 283 indicating worsening CHF.  Patient contacted his cardiologist, Dr. Russell who prescribed torsemide 20 mg daily as well.  His lower extremity edema improved significantly, with complete resolution.  Blood pressure measured at home showed improved trend with most recent measurements around 130s over 70s.  He has been compliant with medication regimen and denies any cardiovascular symptoms including chest pain or pressure, shortness of breath, headaches or lightheadedness.   At today's visit blood pressure is 128/80, and patient is asymptomatic, feeling well.  He has a BMP ordered to monitor kidney function and electrolytes which he will do today  He has a upcoming visit with cardiology for echocardiogram.      Review of Systems  All pertinent positive symptoms are included in the history of present illness.    All other systems have been reviewed and are negative and noncontributory to this patient's current ailments.    Objective   /80   Pulse 67   Ht 1.854 m (6' 1\")   Wt 104 kg (230 lb)   BMI 30.34 kg/m²     Physical Exam  Constitutional:       General: He is not in acute distress.     Appearance: Normal appearance. He is normal weight. He is not ill-appearing.   HENT:      " Head: Normocephalic and atraumatic.      Nose: Nose normal. No congestion or rhinorrhea.      Mouth/Throat:      Mouth: Mucous membranes are moist.   Eyes:      General: No scleral icterus.     Extraocular Movements: Extraocular movements intact.      Conjunctiva/sclera: Conjunctivae normal.   Cardiovascular:      Rate and Rhythm: Normal rate. Rhythm irregular. No extrasystoles are present.     Pulses: Normal pulses.      Heart sounds: Normal heart sounds. No murmur heard.  Pulmonary:      Effort: Pulmonary effort is normal. No respiratory distress.      Breath sounds: Normal breath sounds. No wheezing, rhonchi or rales.   Abdominal:      General: Abdomen is flat. Bowel sounds are normal.      Palpations: Abdomen is soft.   Musculoskeletal:         General: No swelling. Normal range of motion.      Right lower leg: No edema.      Left lower leg: No edema.   Skin:     General: Skin is warm.      Capillary Refill: Capillary refill takes less than 2 seconds.      Findings: No lesion or rash.   Neurological:      General: No focal deficit present.      Mental Status: He is alert and oriented to person, place, and time.   Psychiatric:         Mood and Affect: Mood normal.         Behavior: Behavior normal.         Assessment/Plan   Diagnoses and all orders for this visit:  Primary hypertension  Stage 3 chronic kidney disease, unspecified whether stage 3a or 3b CKD (Multi)  Atrial fibrillation, unspecified type (Multi)  Type 2 diabetes mellitus without complication, with long-term current use of insulin  Hypertensive heart and chronic kidney disease with heart failure and stage 1 through stage 4 chronic kidney disease, or unspecified chronic kidney disease    Blood pressure at today's visit has normalized with a value of 128/80, patient is asymptomatic.  His lower extremity edema has resolved.  Will recheck BMP for any worsening kidney function or electrolyte imbalance and make treatment accommodation  accordingly.  Encourage medication compliance, following a low-sodium diet and exercising regularly.  Recommend following with cardiology, Dr. Russell, and discussing his current diuretics regimen, given history of CKD and potential for electrolyte imbalance.    Thank you for letting us be a part of your care team.  Please call the office if you have further questions or concerns regarding your care    Otherwise, please follow-up as needed for continued care and refills     Anne Kevin MD  PGY2,  Resident  06/11/25

## 2025-06-12 ENCOUNTER — TELEPHONE (OUTPATIENT)
Dept: CARDIOLOGY | Facility: CLINIC | Age: 80
End: 2025-06-12
Payer: MEDICARE

## 2025-06-12 DIAGNOSIS — N18.30 STAGE 3 CHRONIC KIDNEY DISEASE, UNSPECIFIED WHETHER STAGE 3A OR 3B CKD (MULTI): Primary | ICD-10-CM

## 2025-06-12 DIAGNOSIS — N19 RENAL FAILURE, UNSPECIFIED CHRONICITY: ICD-10-CM

## 2025-06-12 LAB
ANION GAP SERPL CALCULATED.4IONS-SCNC: 18 MMOL/L (CALC) (ref 7–17)
BUN SERPL-MCNC: 80 MG/DL (ref 7–25)
BUN/CREAT SERPL: 45 (CALC) (ref 6–22)
CALCIUM SERPL-MCNC: 9.1 MG/DL (ref 8.6–10.3)
CHLORIDE SERPL-SCNC: 96 MMOL/L (ref 98–110)
CO2 SERPL-SCNC: 23 MMOL/L (ref 20–32)
CREAT SERPL-MCNC: 1.77 MG/DL (ref 0.7–1.28)
EGFRCR SERPLBLD CKD-EPI 2021: 39 ML/MIN/1.73M2
GLUCOSE SERPL-MCNC: 100 MG/DL (ref 65–99)
POTASSIUM SERPL-SCNC: 4.4 MMOL/L (ref 3.5–5.3)
SODIUM SERPL-SCNC: 137 MMOL/L (ref 135–146)

## 2025-06-12 NOTE — TELEPHONE ENCOUNTER
Pt contacted and verbalized understanding.     Pt has appt with [facility] nephrology in August. Notified pt that Dr. Silva's office is going to try and get him in sooner. STAT referral was placed.

## 2025-06-12 NOTE — TELEPHONE ENCOUNTER
----- Message from Orlando Russell sent at 6/12/2025  8:02 AM EDT -----  BUN 80 Cr up further to 1.8.  Stop Torsemide.  Needs urgent referral to nephrology first available. Edema is due to kidney failure.  ----- Message -----  From: [facility] Results In  Sent: 6/12/2025   7:57 AM EDT  To: Orlando Russell MD

## 2025-06-16 ENCOUNTER — HOSPITAL ENCOUNTER (OUTPATIENT)
Dept: CARDIOLOGY | Facility: CLINIC | Age: 80
Discharge: HOME | End: 2025-06-16
Payer: MEDICARE

## 2025-06-16 DIAGNOSIS — R60.0 BILATERAL EDEMA OF LOWER EXTREMITY: ICD-10-CM

## 2025-06-16 DIAGNOSIS — I48.91 UNSPECIFIED ATRIAL FIBRILLATION (MULTI): ICD-10-CM

## 2025-06-16 LAB
AORTIC VALVE PEAK VELOCITY: 1.69 M/S
AV PEAK GRADIENT: 11 MMHG
AVA (PEAK VEL): 2.55 CM2
EJECTION FRACTION APICAL 4 CHAMBER: 76.3
EJECTION FRACTION: 64 %
LEFT ATRIUM VOLUME AREA LENGTH INDEX BSA: 37.7 ML/M2
LEFT VENTRICLE INTERNAL DIMENSION DIASTOLE: 5.41 CM (ref 3.5–6)
LEFT VENTRICULAR OUTFLOW TRACT DIAMETER: 2.2 CM
RIGHT VENTRICLE FREE WALL PEAK S': 17 CM/S
RIGHT VENTRICLE PEAK SYSTOLIC PRESSURE: 50 MMHG
TRICUSPID ANNULAR PLANE SYSTOLIC EXCURSION: 2 CM

## 2025-06-16 PROCEDURE — 93306 TTE W/DOPPLER COMPLETE: CPT | Performed by: INTERNAL MEDICINE

## 2025-06-16 PROCEDURE — 93306 TTE W/DOPPLER COMPLETE: CPT

## 2025-06-17 ENCOUNTER — PATIENT MESSAGE (OUTPATIENT)
Dept: PRIMARY CARE | Facility: CLINIC | Age: 80
End: 2025-06-17
Payer: MEDICARE

## 2025-06-18 ENCOUNTER — TELEPHONE (OUTPATIENT)
Dept: CARDIOLOGY | Facility: CLINIC | Age: 80
End: 2025-06-18
Payer: MEDICARE

## 2025-06-18 NOTE — TELEPHONE ENCOUNTER
----- Message from Orlando Russell sent at 6/18/2025 12:00 PM EDT -----  Echo Normal LV and RV function and no valve disease  ----- Message -----  From: Natacha, Syngo - Cardiology Results In  Sent: 6/16/2025   6:11 PM EDT  To: Orlando Russell MD

## 2025-06-19 ENCOUNTER — APPOINTMENT (OUTPATIENT)
Dept: CARDIOLOGY | Facility: CLINIC | Age: 80
End: 2025-06-19
Payer: MEDICARE

## 2025-06-20 DIAGNOSIS — I10 PRIMARY HYPERTENSION: Primary | ICD-10-CM

## 2025-06-20 RX ORDER — AMLODIPINE BESYLATE 5 MG/1
5 TABLET ORAL DAILY
Qty: 30 TABLET | Refills: 1 | Status: SHIPPED | OUTPATIENT
Start: 2025-06-20

## 2025-07-08 ENCOUNTER — APPOINTMENT (OUTPATIENT)
Dept: NEPHROLOGY | Facility: CLINIC | Age: 80
End: 2025-07-08
Payer: MEDICARE

## 2025-07-08 VITALS
DIASTOLIC BLOOD PRESSURE: 70 MMHG | TEMPERATURE: 97.7 F | WEIGHT: 232 LBS | HEIGHT: 73 IN | BODY MASS INDEX: 30.75 KG/M2 | HEART RATE: 63 BPM | SYSTOLIC BLOOD PRESSURE: 173 MMHG | OXYGEN SATURATION: 96 %

## 2025-07-08 DIAGNOSIS — N18.31 STAGE 3A CHRONIC KIDNEY DISEASE (MULTI): Primary | ICD-10-CM

## 2025-07-08 DIAGNOSIS — E78.49 OTHER HYPERLIPIDEMIA: ICD-10-CM

## 2025-07-08 DIAGNOSIS — E11.9 TYPE 2 DIABETES MELLITUS WITHOUT COMPLICATION, WITHOUT LONG-TERM CURRENT USE OF INSULIN: ICD-10-CM

## 2025-07-08 DIAGNOSIS — I25.10 CORONARY ARTERY DISEASE INVOLVING NATIVE CORONARY ARTERY OF NATIVE HEART WITHOUT ANGINA PECTORIS: ICD-10-CM

## 2025-07-08 DIAGNOSIS — N19 RENAL FAILURE, UNSPECIFIED CHRONICITY: ICD-10-CM

## 2025-07-08 DIAGNOSIS — I48.11 LONGSTANDING PERSISTENT ATRIAL FIBRILLATION (MULTI): ICD-10-CM

## 2025-07-08 DIAGNOSIS — I10 PRIMARY HYPERTENSION: ICD-10-CM

## 2025-07-08 LAB
POC APPEARANCE, URINE: ABNORMAL
POC BILIRUBIN, URINE: NEGATIVE
POC BLOOD, URINE: NEGATIVE
POC COLOR, URINE: YELLOW
POC GLUCOSE, URINE: ABNORMAL MG/DL
POC KETONES, URINE: NEGATIVE MG/DL
POC LEUKOCYTES, URINE: NEGATIVE
POC NITRITE,URINE: NEGATIVE
POC PH, URINE: 5.5 PH
POC PROTEIN, URINE: NEGATIVE MG/DL
POC SPECIFIC GRAVITY, URINE: 1.01
POC UROBILINOGEN, URINE: 0.2 EU/DL

## 2025-07-08 PROCEDURE — 3078F DIAST BP <80 MM HG: CPT | Performed by: INTERNAL MEDICINE

## 2025-07-08 PROCEDURE — 99205 OFFICE O/P NEW HI 60 MIN: CPT | Performed by: INTERNAL MEDICINE

## 2025-07-08 PROCEDURE — G2211 COMPLEX E/M VISIT ADD ON: HCPCS | Performed by: INTERNAL MEDICINE

## 2025-07-08 PROCEDURE — 1159F MED LIST DOCD IN RCRD: CPT | Performed by: INTERNAL MEDICINE

## 2025-07-08 PROCEDURE — 3077F SYST BP >= 140 MM HG: CPT | Performed by: INTERNAL MEDICINE

## 2025-07-08 PROCEDURE — 81003 URINALYSIS AUTO W/O SCOPE: CPT | Performed by: INTERNAL MEDICINE

## 2025-07-08 NOTE — PATIENT INSTRUCTIONS
Dear Ed-it was nice meeting you nephrology clinic today discuss the following    # Chronic kidney disease stage IIIa-baseline kidney function 45%.  Recently worsening to 39% after torsemide.  Will repeat blood work today    # Hypertension-currently good control at home.  Continue lisinopril 20 mg, lisinopril/hydrochlorothiazide 20/25 mg.  He will send me 2 weeks blood pressure log twice daily blood pressure checks.  Fluid intake should be around 45-60 ounces.  Too much fluid intake might drop your sodium.  Discussed low-salt diet, physical activity, avoid processed meat and deli meat      # Type 2 diabetes-excellent control      # Occasional feet swelling with amlodipine noted.  Currently off amlodipine.  Chronic slow heart rate noted-will try to avoid beta-blocker in the future    Recommendation to follow after results and blood pressure log    Follow-up in 6 months with repeat blood work urinalysis prior to next visit    Grisel Zuluaga MD, MS, MIKEY ORTEGA   Clinical  - [facility] [employer]   Nephrologist - [facility] - [facility] [place]

## 2025-07-08 NOTE — PROGRESS NOTES
"Dylon Khanna \"ED\" is a 79 y.o. male who has past medical history of coronary artery disease status post PCI, A-fib on Eliquis and aspirin, hyperlipidemia, type 2 diabetes, CKD stage III-IV, hypertension was coming to see me today initial consultation for hypertension management    Ed came alone today.  He is aware of his past medical history.  He has well-controlled hypertension and diabetes.  He has intolerance to amlodipine due to leg swelling.  Recently, he had acute leg swelling.  DVT Doppler was negative.  Torsemide was added.  Swelling improved.  Repeat blood work showed worsening serum creatinine from 1.3-1.5 up to 1.7, GFR went down from 45-50 down to 39.  Torsemide was discontinued.  Patient was instructed to come see me for evaluation for worsening kidney function and blood pressure control    No concerns today.  He feels fine.  No headache or blurry vision.  Chronic bradycardia noticed.  Asymptomatic.  Is currently on lisinopril 20 mg with lisinopril/hydrochlorothiazide 20/25.  He checks blood pressure at home and averaging 130/70.  Blood pressure elevated today in office concerning for whitecoat syndrome.  He was instructed to check blood pressure at home and send me blood pressure log.  He works out regularly.  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  Wt Readings from Last 3 Encounters:   25 104 kg (230 lb)   25 108 kg (238 lb)   25 107 kg (236 lb 5.3 oz)       Physical Exam    General appearance: no distress awake and alert on room air, euvolemic on exam  Eyes: non-icteric  HEENT: atrumatic head, PEERLA, moist mucosa  Skin: no apparent rash  Heart: NSR, S1, S2 normal, no murmur or gallop  Lungs: Symmetrical " "expansion,CTA bilat no wheezing/crackles  Abdomen: soft, nt/nd, obese  Extremities: no edema bilat, trace ankle swelling  Neuro: No FND,asterixis, no focal deficits noticed        Review of Systems     Constitutional: no fever, no chills, no recent weight gain and no recent weight loss.   Eyes: no blurred vision and no diplopia.   ENT: no hearing loss, no earache, no sore throat, no swollen glands in the neck and no nasal discharge.   Cardiovascular: no chest pain, no palpitations and no lower extremity edema.   Respiratory: no shortness of breath, no chronic cough and no shortness of breath during exertion.   Gastrointestinal: no abdominal pain, no constipation, no heartburn, no vomiting, no bloody stools and no change in bowel movements.   Genitourinary: no dysuria and no hematuria.   Musculoskeletal: no arthralgias and no myalgias.   Skin: no rashes and no skin lesions.   Neurological: no headaches and no dizziness.   Psychiatric: no confusion, no depression and no anxiety.   Endocrine: no heat intolerance, no cold intolerance, appetite not increased, no thyroid disorder, no increased urinary frequency and no dry skin.   Hematologic/Lymphatic: does not bleed easily and does not bruise easily.   All other systems have been reviewed and are negative for complaint.         Data Review                   No results found for: \"URICACID\"        Lab Results   Component Value Date    HGBA1C 6.5 (H) 02/25/2025                 No lab exists for component: \"CR\", \"PHOSPHORUS\"        No results found for: \"MICROALBCREA\"         RFP  Recent Labs     06/11/25  1034 06/03/25  0922 02/25/25  0943 08/21/24  0948 02/21/24  0847 10/17/23  1130 10/16/23  1043    143 141 140 139 137 140   K 4.4 5.0 4.6 4.7 4.6 4.5 5.4*   CL 96* 105 101 102 103 100 103   CO2 23 26 30 28 29 26 28   BUN 80* 33* 25 29* 28* 31* 36*   CREATININE 1.77* 1.56* 1.37* 1.48* 1.28 1.37* 1.41*   GLUCOSE 100* 125* 97 104* 91 135* 90   CALCIUM 9.1 9.3 9.4 9.2 " "9.2 9.4 9.8   EGFR 39* 45* 53* 48* 58* 53* 52*   ANIONGAP 18* 12 10 15 12 16 14        Urineanalysis  No results for input(s): \"COLORU\", \"APPEARANCEU\", \"SPECGRAVU\", \"SANDEE\", \"PROTUR\", \"GLUCOSEU\", \"BLOODU\", \"KETONESU\", \"BILIRUBINU\", \"NITRITEU\", \"LEUKOCYTESU\" in the last 03517 hours.    No lab exists for component: \"UROBILOGEN\"    Urine Electrolytes  No results for input(s): \"SODIUMURR\", \"NACREATUR\", \"KUR\", \"KCREATUR\", \"CREATU\", \"PROTUR\", \"UTPCR\", \"ALBUMINUR\", \"MICROALBCREA\" in the last 34275 hours.     Urine Micro  No results for input(s): \"WBCU\", \"RBCU\", \"HYALCASTU\", \"SQUAMEPIU\", \"BACTERIAU\", \"MUCUSU\" in the last 67043 hours.     Iron  No results for input(s): \"IRON\", \"TIBC\", \"IRONSAT\", \"FERRITIN\" in the last 43474 hours.       Medications Ordered Prior to Encounter[1]        Assessment and Plan       Sean Khanna \"ED\"  is a 79 y.o. male who has past medical history of coronary artery disease status post PCI, A-fib on Eliquis and aspirin, hyperlipidemia, type 2 diabetes, CKD stage III-IV, hypertension was coming to see me today initial consultation for hypertension management    # CKD stage IIIa/A1-possible atherosclerotic cardiovascular disease/diabetic nephropathy  - Baseline serum creatinine 1.3-1.5, GFR 45-50.  Relatively stable  - Recent worsening serum creatinine 1.7, GFR 39 in June 2025 possibly due to torsemide.  Currently off.  Repeat blood work today  - Within normal electrolytes  - Urine dipstick today was bland urine with no significant albuminuria  - No recent kidney image for me to review-will defer at this time  - Continue RAAS inhibitors with lisinopril, Jardiance 25 mg    # Hypertension-elevated in office today  - Acceptable control at home 130/70  - Current medication lisinopril 20 mg, lisinopril/hydrochlorothiazide 20/25  - Discussed lifestyle changes today  - Instructed blood pressure log  - No changes today  - Will try to avoid amlodipine due to prior leg swelling, beta-blocker due to chronic " "bradycardia    # Type 2 diabetes-well-controlled  - Most recent A1c 6.5  - Current medication Jardiance and insulin    # Coronary artery disease/PCI/chronic bradycardia  - Currently on statins, RAAS inhibitors, SGL 2 inhibitors not on diuretics  - Recent echo done in June 2025 showed ejection fraction 64%, RVSP elevated 50 mmHg-currently not on diuretics      #Others  - No NSAIDs, no contrast as possible. If to be done- we recommend holding ACEi/ARBS/diuretics 24 hrs prior to contrast exposure and ensure appropriate hydration   - Ensure well hydration  - Limit salt in diet  - No smoking    Patient received CKD education and counselling  Blood work today  Follow-up in 6 months with repeat blood work urinalysis protectiveness    Grisel Zuluaga MD, MS, MIKEY ORTEGA   Clinical  - [facility] [employer]   Nephrologist - [facility]                 [1]   Current Outpatient Medications on File Prior to Visit   Medication Sig Dispense Refill    amLODIPine (Norvasc) 5 mg tablet Take 1 tablet (5 mg) by mouth once daily. 30 tablet 1    aspirin 81 mg EC tablet Take 1 tablet (81 mg) by mouth once daily.      BD Gina 2nd Gen Pen Needle 32 gauge x 5/32\" needle Twice daily 200 each 3    Eliquis 5 mg tablet Take 1 tablet (5 mg) by mouth 2 times a day. 180 tablet 3    HumaLOG Mix 75-25 KwikPen 100 unit/mL (75-25) pen Inject 28 Units under the skin 2 times daily (morning and late afternoon). 60 mL 3    Jardiance 25 mg tablet TAKE 1 TABLET BY MOUTH EVERY DAY 30 tablet 11    ketoconazole (NIZOral) 2 % shampoo Apply topically 3 (three) times a week. 120 mL 11    ketoconazole-iodoquinoL-hc 2-1-2.5 % cream Apply a thin layer to the affect area in the morning and at night 30 g 2    lisinopril 20 mg tablet Take 1 tablet (20 mg) by mouth once daily. 90 tablet 0    lisinopriL-hydrochlorothiazide 20-25 mg tablet Take 1 tablet by mouth once daily. 90 tablet 1    " metFORMIN (Glucophage) 850 mg tablet TAKE 1 TABLET (850 MG) BY MOUTH 2 TIMES A DAY. 180 tablet 3    OneTouch Delica Plus Lancet 30 gauge misc FOR GLUCOSE TESTING 3 TIMES DAILY      OneTouch Verio Flex meter misc FOR GLUCOSE TESTING 3 TIMES DAILY      OneTouch Verio test strips strip TEST 3 TIMES DAILY.      simvastatin (Zocor) 40 mg tablet Take 1 tablet (40 mg) by mouth once daily at bedtime. 90 tablet 1     No current facility-administered medications on file prior to visit.

## 2025-07-09 LAB
ALBUMIN SERPL-MCNC: 4 G/DL (ref 3.6–5.1)
BUN SERPL-MCNC: 22 MG/DL (ref 7–25)
BUN/CREAT SERPL: NORMAL (CALC) (ref 6–22)
CALCIUM SERPL-MCNC: 9.2 MG/DL (ref 8.6–10.3)
CHLORIDE SERPL-SCNC: 101 MMOL/L (ref 98–110)
CO2 SERPL-SCNC: 25 MMOL/L (ref 20–32)
CREAT SERPL-MCNC: 1.24 MG/DL (ref 0.7–1.28)
EGFRCR SERPLBLD CKD-EPI 2021: 60 ML/MIN/1.73M2
GLUCOSE SERPL-MCNC: 75 MG/DL (ref 65–99)
PHOSPHATE SERPL-MCNC: 3.1 MG/DL (ref 2.1–4.3)
POTASSIUM SERPL-SCNC: 3.8 MMOL/L (ref 3.5–5.3)
SODIUM SERPL-SCNC: 137 MMOL/L (ref 135–146)

## 2025-07-18 ENCOUNTER — APPOINTMENT (OUTPATIENT)
Dept: PRIMARY CARE | Facility: CLINIC | Age: 80
End: 2025-07-18
Payer: MEDICARE

## 2025-07-18 VITALS
SYSTOLIC BLOOD PRESSURE: 158 MMHG | OXYGEN SATURATION: 99 % | DIASTOLIC BLOOD PRESSURE: 70 MMHG | WEIGHT: 227 LBS | HEART RATE: 63 BPM | BODY MASS INDEX: 29.95 KG/M2

## 2025-07-18 DIAGNOSIS — N18.31 TYPE 2 DIABETES MELLITUS WITH STAGE 3A CHRONIC KIDNEY DISEASE, WITH LONG-TERM CURRENT USE OF INSULIN (MULTI): ICD-10-CM

## 2025-07-18 DIAGNOSIS — E78.2 MIXED HYPERLIPIDEMIA: ICD-10-CM

## 2025-07-18 DIAGNOSIS — Z79.4 TYPE 2 DIABETES MELLITUS WITH STAGE 3A CHRONIC KIDNEY DISEASE, WITH LONG-TERM CURRENT USE OF INSULIN (MULTI): ICD-10-CM

## 2025-07-18 DIAGNOSIS — I10 PRIMARY HYPERTENSION: Primary | ICD-10-CM

## 2025-07-18 DIAGNOSIS — N18.30 STAGE 3 CHRONIC KIDNEY DISEASE, UNSPECIFIED WHETHER STAGE 3A OR 3B CKD (MULTI): ICD-10-CM

## 2025-07-18 DIAGNOSIS — E11.22 TYPE 2 DIABETES MELLITUS WITH STAGE 3A CHRONIC KIDNEY DISEASE, WITH LONG-TERM CURRENT USE OF INSULIN (MULTI): ICD-10-CM

## 2025-07-18 DIAGNOSIS — I48.91 ATRIAL FIBRILLATION, UNSPECIFIED TYPE (MULTI): ICD-10-CM

## 2025-07-18 PROCEDURE — 3077F SYST BP >= 140 MM HG: CPT | Performed by: STUDENT IN AN ORGANIZED HEALTH CARE EDUCATION/TRAINING PROGRAM

## 2025-07-18 PROCEDURE — 3078F DIAST BP <80 MM HG: CPT | Performed by: STUDENT IN AN ORGANIZED HEALTH CARE EDUCATION/TRAINING PROGRAM

## 2025-07-18 PROCEDURE — 1036F TOBACCO NON-USER: CPT | Performed by: STUDENT IN AN ORGANIZED HEALTH CARE EDUCATION/TRAINING PROGRAM

## 2025-07-18 PROCEDURE — 1159F MED LIST DOCD IN RCRD: CPT | Performed by: STUDENT IN AN ORGANIZED HEALTH CARE EDUCATION/TRAINING PROGRAM

## 2025-07-18 PROCEDURE — 99214 OFFICE O/P EST MOD 30 MIN: CPT | Performed by: STUDENT IN AN ORGANIZED HEALTH CARE EDUCATION/TRAINING PROGRAM

## 2025-07-18 PROCEDURE — G2211 COMPLEX E/M VISIT ADD ON: HCPCS | Performed by: STUDENT IN AN ORGANIZED HEALTH CARE EDUCATION/TRAINING PROGRAM

## 2025-07-18 RX ORDER — LISINOPRIL AND HYDROCHLOROTHIAZIDE 20; 25 MG/1; MG/1
1 TABLET ORAL DAILY
Qty: 90 TABLET | Refills: 1 | Status: SHIPPED | OUTPATIENT
Start: 2025-07-18

## 2025-07-18 RX ORDER — ACETAMINOPHEN 500 MG
1 TABLET ORAL DAILY
Qty: 1 EACH | Refills: 0 | Status: SHIPPED | OUTPATIENT
Start: 2025-07-18

## 2025-07-18 RX ORDER — LISINOPRIL 20 MG/1
20 TABLET ORAL DAILY
Qty: 90 TABLET | Refills: 1 | Status: SHIPPED | OUTPATIENT
Start: 2025-07-18 | End: 2026-01-14

## 2025-07-18 RX ORDER — SIMVASTATIN 40 MG/1
40 TABLET, FILM COATED ORAL NIGHTLY
Qty: 90 TABLET | Refills: 1 | Status: SHIPPED | OUTPATIENT
Start: 2025-07-18

## 2025-07-18 NOTE — PROGRESS NOTES
Subjective   Reason for Visit: Sean Khanna is an 79 y.o. male here for a 6-month follow-up and med refills.               HPI    1. Diabetes Mellitus Type II/CKD  Takes Jardiance 25 mg, Metformin 850 twice a day, and humalog   Most recent hemoglobin A1c at 6.5%  Follows with Dr. Novak in endocrinology to manage his diabetes, Most recent GFR did improve and returned to baseline 60%  Denies polyuria,u polydipsia, numbness or tingling     2. Atrial Fibrillation  metoprolol 12.5 mg twice a day was discontinued due to bradycardia  Currently taking Eliquis 5 mg twice daily  Following with Dr. Russell with his last appointment early in March  Denies chest pain, chest pressure, heart palpitations or any other acute cardiopulmonary symptoms      3. Hyperlipidemia   Takes Simvastatin 40 mg   Most recent cholesterol panel Cholesterol looks good at 111, HDL 43, triglycerides 70, LDL 53   Requesting refills     4. Hypertension   Patient has had difficulty controlling his blood pressure during recent months  Metoprolol was discontinued due to bradycardia, so increase the dose of lisinopril now at 40 mg daily alongside hydrochlorothiazide 25 mg daily  Blood pressure has been fluctuating with SBP value going up to 170s  Recently was seen by nephrologist, where he was recommended to keep a log and report back in 2-4 weeks.  No changes were made at that time  GFR returned to baseline, likely decrease of GFR was caused by briefly use of torsemide  He uses a wrist BP cuff which he keeps in his bedroom in the second floor  Denies chest pain, lightheadedness, dizziness or any other acute cardiopulmonary symptoms         No Known Allergies    Immunization History   Administered Date(s) Administered    Influenza, Seasonal, Quadrivalent, Adjuvanted 10/02/2023    Influenza, seasonal, injectable 10/02/2023    Moderna COVID-19 vaccine, bivalent, blue cap/gray label *Check age/dose* 11/10/2022    Moderna SARS-CoV-2 Vaccination 02/22/2021,  03/22/2021, 11/16/2021    Pfizer COVID-19 vaccine, 12 years and older, (30mcg/0.3mL) (Comirnaty) 10/05/2023    Pfizer Purple Cap SARS-CoV-2 10/05/2023    Pneumococcal Conjugate PCV 7 1946    Pneumococcal conjugate vaccine, 13-valent (PREVNAR 13) 10/02/2018    Pneumococcal polysaccharide vaccine, 23-valent, age 2 years and older (PNEUMOVAX 23) 12/10/2019    RSV, 60 Years And Older (AREXVY) 10/16/2023    Zoster vaccine, recombinant, adult (SHINGRIX) 02/08/2024, 05/14/2024                           Patient Care Team:  Ante T Pletikosic, DO as PCP - General  Ante T Pletikosic, DO as PCP - [employer] PCP     Review of Systems  All pertinent positive symptoms are included in the history of present illness.    All other systems have been reviewed and are negative and noncontributory to this patient's current ailments.    Objective   Vitals:  There were no vitals taken for this visit.    Office Visit on 07/08/2025   Component Date Value Ref Range Status    POC Color, Urine 07/08/2025 Yellow  Straw, Yellow, Light-Yellow Final    POC Appearance, Urine 07/08/2025 Hazy (A)  Clear Final    POC Glucose, Urine 07/08/2025 500 (3+) (A)  NEGATIVE mg/dl Final    POC Bilirubin, Urine 07/08/2025 NEGATIVE  NEGATIVE Final    POC Ketones, Urine 07/08/2025 NEGATIVE  NEGATIVE mg/dl Final    POC Specific Gravity, Urine 07/08/2025 1.010  1.005 - 1.035 Final    POC Blood, Urine 07/08/2025 NEGATIVE  NEGATIVE Final    POC PH, Urine 07/08/2025 5.5  No Reference Range Established PH Final    POC Protein, Urine 07/08/2025 NEGATIVE  NEGATIVE mg/dl Final    POC Urobilinogen, Urine 07/08/2025 0.2  0.2, 1.0 EU/DL Final    Poc Nitrite, Urine 07/08/2025 NEGATIVE  NEGATIVE Final    POC Leukocytes, Urine 07/08/2025 NEGATIVE  NEGATIVE Final    GLUCOSE 07/08/2025 75  65 - 99 mg/dL Final    Comment:               Fasting reference interval         UREA NITROGEN (BUN) 07/08/2025 22  7 - 25 mg/dL Final    CREATININE 07/08/2025 1.24  0.70 -  1.28 mg/dL Final    EGFR 07/08/2025 60  > OR = 60 mL/min/1.73m2 Final    BUN/CREATININE RATIO 07/08/2025 SEE NOTE:  6 - 22 (calc) Final    Comment:    Not Reported: BUN and Creatinine are within     reference range.            SODIUM 07/08/2025 137  135 - 146 mmol/L Final    POTASSIUM 07/08/2025 3.8  3.5 - 5.3 mmol/L Final    CHLORIDE 07/08/2025 101  98 - 110 mmol/L Final    CARBON DIOXIDE 07/08/2025 25  20 - 32 mmol/L Final    CALCIUM 07/08/2025 9.2  8.6 - 10.3 mg/dL Final    PHOSPHATE (AS PHOSPHORUS) 07/08/2025 3.1  2.1 - 4.3 mg/dL Final    ALBUMIN 07/08/2025 4.0  3.6 - 5.1 g/dL Final   Hospital Outpatient Visit on 06/16/2025   Component Date Value Ref Range Status    AV pk doris 06/16/2025 1.69  m/s Final    LVOT diam 06/16/2025 2.20  cm Final    LA vol index A/L 06/16/2025 37.7  ml/m2 Final    Tricuspid annular plane systolic e* 06/16/2025 2.0  cm Final    LV EF 06/16/2025 64  % Final    RV free wall pk S' 06/16/2025 17.00  cm/s Final    LVIDd 06/16/2025 5.41  cm Final    RVSP 06/16/2025 50  mmHg Final    Aortic Valve Area by Continuity of* 06/16/2025 2.55  cm2 Final    AV pk grad 06/16/2025 11  mmHg Final    LV A4C EF 06/16/2025 76.3   Final   Orders Only on 06/11/2025   Component Date Value Ref Range Status    GLUCOSE 06/11/2025 100 (H)  65 - 99 mg/dL Final    Comment:               Fasting reference interval     For someone without known diabetes, a glucose value  between 100 and 125 mg/dL is consistent with  prediabetes and should be confirmed with a  follow-up test.         UREA NITROGEN (BUN) 06/11/2025 80 (H)  7 - 25 mg/dL Final    CREATININE 06/11/2025 1.77 (H)  0.70 - 1.28 mg/dL Final    EGFR 06/11/2025 39 (L)  > OR = 60 mL/min/1.73m2 Final    BUN/CREATININE RATIO 06/11/2025 45 (H)  6 - 22 (calc) Final    SODIUM 06/11/2025 137  135 - 146 mmol/L Final    POTASSIUM 06/11/2025 4.4  3.5 - 5.3 mmol/L Final    CHLORIDE 06/11/2025 96 (L)  98 - 110 mmol/L Final    CARBON DIOXIDE 06/11/2025 23  20 - 32 mmol/L  Final    ELECTROLYTE BALANCE 06/11/2025 18 (H)  7 - 17 mmol/L (calc) Final    CALCIUM 06/11/2025 9.1  8.6 - 10.3 mg/dL Final   Office Visit on 06/03/2025   Component Date Value Ref Range Status    GLUCOSE 06/03/2025 125 (H)  65 - 99 mg/dL Final    Comment:               Fasting reference interval     For someone without known diabetes, a glucose value  between 100 and 125 mg/dL is consistent with  prediabetes and should be confirmed with a  follow-up test.         UREA NITROGEN (BUN) 06/03/2025 33 (H)  7 - 25 mg/dL Final    CREATININE 06/03/2025 1.56 (H)  0.70 - 1.28 mg/dL Final    EGFR 06/03/2025 45 (L)  > OR = 60 mL/min/1.73m2 Final    SODIUM 06/03/2025 143  135 - 146 mmol/L Final    POTASSIUM 06/03/2025 5.0  3.5 - 5.3 mmol/L Final    CHLORIDE 06/03/2025 105  98 - 110 mmol/L Final    CARBON DIOXIDE 06/03/2025 26  20 - 32 mmol/L Final    ELECTROLYTE BALANCE 06/03/2025 12  7 - 17 mmol/L (calc) Final    CALCIUM 06/03/2025 9.3  8.6 - 10.3 mg/dL Final    PROTEIN, TOTAL 06/03/2025 7.6  6.1 - 8.1 g/dL Final    ALBUMIN 06/03/2025 4.2  3.6 - 5.1 g/dL Final    BILIRUBIN, TOTAL 06/03/2025 0.6  0.2 - 1.2 mg/dL Final    ALKALINE PHOSPHATASE 06/03/2025 89  35 - 144 U/L Final    AST 06/03/2025 14  10 - 35 U/L Final    ALT 06/03/2025 15  9 - 46 U/L Final    B TYPE NATRIURETIC PEPTIDE (BNP) 06/03/2025 283 (H)  <100 pg/mL Final    Comment:    BNP levels increase with age in the general  population with the highest values seen in  individuals greater than 75 years of age.  Reference: J. Am. Shai. Cardiol. 2002; 40:976-982.        Office Visit on 03/05/2025   Component Date Value Ref Range Status    Ventricular Rate 03/05/2025 60  BPM Final    Atrial Rate 03/05/2025 86  BPM Final    QRS Duration 03/05/2025 74  ms Final    QT Interval 03/05/2025 422  ms Final    QTC Calculation(Bazett) 03/05/2025 422  ms Final    R Axis 03/05/2025 94  degrees Final    T Axis 03/05/2025 40  degrees Final    QRS Count 03/05/2025 10  beats Final    Q  Onset 03/05/2025 228  ms Final    T Offset 03/05/2025 439  ms Final    QTC Fredericia 03/05/2025 422  ms Final   Office Visit on 02/25/2025   Component Date Value Ref Range Status    CHOLESTEROL, TOTAL 02/25/2025 111  <200 mg/dL Final    HDL CHOLESTEROL 02/25/2025 43  > OR = 40 mg/dL Final    TRIGLYCERIDES 02/25/2025 70  <150 mg/dL Final    LDL-CHOLESTEROL 02/25/2025 53  mg/dL (calc) Final    Comment: Reference range: <100     Desirable range <100 mg/dL for primary prevention;    <70 mg/dL for patients with CHD or diabetic patients   with > or = 2 CHD risk factors.     LDL-C is now calculated using the Julianne   calculation, which is a validated novel method providing   better accuracy than the Friedewald equation in the   estimation of LDL-C.   Oc PORTER et al. ABI. 2013;310(19): 1676-8937   (http://education.[employer]/faq/HLW471)      CHOL/HDLC RATIO 02/25/2025 2.6  <5.0 (calc) Final    NON HDL CHOLESTEROL 02/25/2025 68  <130 mg/dL (calc) Final    Comment: For patients with diabetes plus 1 major ASCVD risk   factor, treating to a non-HDL-C goal of <100 mg/dL   (LDL-C of <70 mg/dL) is considered a therapeutic   option.      GLUCOSE 02/25/2025 97  65 - 99 mg/dL Final    Comment:               Fasting reference interval         UREA NITROGEN (BUN) 02/25/2025 25  7 - 25 mg/dL Final    CREATININE 02/25/2025 1.37 (H)  0.70 - 1.28 mg/dL Final    EGFR 02/25/2025 53 (L)  > OR = 60 mL/min/1.73m2 Final    SODIUM 02/25/2025 141  135 - 146 mmol/L Final    POTASSIUM 02/25/2025 4.6  3.5 - 5.3 mmol/L Final    CHLORIDE 02/25/2025 101  98 - 110 mmol/L Final    CARBON DIOXIDE 02/25/2025 30  20 - 32 mmol/L Final    ELECTROLYTE BALANCE 02/25/2025 10  7 - 17 mmol/L (calc) Final    CALCIUM 02/25/2025 9.4  8.6 - 10.3 mg/dL Final    PROTEIN, TOTAL 02/25/2025 7.6  6.1 - 8.1 g/dL Final    ALBUMIN 02/25/2025 3.9  3.6 - 5.1 g/dL Final    BILIRUBIN, TOTAL 02/25/2025 0.5  0.2 - 1.2 mg/dL Final    ALKALINE PHOSPHATASE  02/25/2025 87  35 - 144 U/L Final    AST 02/25/2025 11  10 - 35 U/L Final    ALT 02/25/2025 12  9 - 46 U/L Final    PSA, TOTAL 02/25/2025 2.30  < OR = 4.00 ng/mL Final    Comment: The total PSA value from this assay system is   standardized against the WHO standard. The test   result will be approximately 20% lower when compared   to the equimolar-standardized total PSA (Lizz   Wayne). Comparison of serial PSA results should be   interpreted with this fact in mind.     This test was performed using the Siemens   chemiluminescent method. Values obtained from   different assay methods cannot be used  interchangeably. PSA levels, regardless of  value, should not be interpreted as absolute  evidence of the presence or absence of disease.      HEMOGLOBIN A1c 02/25/2025 6.5 (H)  <5.7 % of total Hgb Final    Comment: For someone without known diabetes, a hemoglobin A1c  value of 6.5% or greater indicates that they may have   diabetes and this should be confirmed with a follow-up   test.     For someone with known diabetes, a value <7% indicates   that their diabetes is well controlled and a value   greater than or equal to 7% indicates suboptimal   control. A1c targets should be individualized based on   duration of diabetes, age, comorbid conditions, and   other considerations.     Currently, no consensus exists regarding use of  hemoglobin A1c for diagnosis of diabetes for children.          eAG (mg/dL) 02/25/2025 140  mg/dL Final    eAG (mmol/L) 02/25/2025 7.7  mmol/L Final       Physical Exam  CONSTITUTIONAL - well nourished, well developed, looks like stated age, in no acute distress, not ill-appearing, and not tired appearing  SKIN - normal skin color and pigmentation  HEAD - no trauma, normocephalic  ENT - TM's intact, no injection, no signs of infection, uvula midline, normal tongue movement and throat normal, no exudate, nasal passage without discharge and patent  EYES - pupils are equal and reactive to  light  NECK - supple without rigidity  CHEST - clear to auscultation, no wheezing, no crackles and no rales, good effort  CARDIAC - regular rate and irregular rhythm, no murmur  ABDOMEN - no organomegaly, soft, nontender, nondistended  EXTREMITIES - no edema, no deformities  NEUROLOGICAL - normal gait, normal balance, normal motor  PSYCHIATRIC - alert, pleasant and cordial, age-appropriate  IMMUNOLOGIC - no cervical lymphadenopathy    Assessment/Plan       1. Diabetes Mellitus Type II/CKD stage 3a  Continue Jardiance 25 mg, Metformin 850 twice a day, and humalog  Continue to follow up with endocrinologist with your next scheduled appointment in the near future  Hemoglobin A1c did note stability but we will continue monitoring closely  We will continue monitoring closely so please stay hydrated      2. Atrial Fibrillation  Continues to be on A-fib  Continue Eliquis 5 mg twice a day   Continue following with Dr. Russell      3. Hyperlipidemia   Continue Simvastatin 40 mg which will  I will notify you of the results and make treatment recommendations accordingly      4. Hypertension   Continue lisinopril 40 mg/hydrochlorothiazide 25 mg daily alongside amlodipine 2.5 mg at bedtime (to prevent leg swelling) which can be increased further to 5 mg if tolerated  Please continue to monitor blood pressure and follow-up with nephrology as previously discussed  Placed order for BP cuff in cubital area  I would like to have you monitor and record blood pressures at home   Blood pressure goal should be below 130/80, ideally 120/80       Thank you for letting us be a part of your care team.  Please call the office if you have further questions or concerns regarding your care    Otherwise, please follow-up in 6 months for continued care and refills as well as your yearly physical examination    Anne Kevin MD  PGY3,  Resident  07/18/25

## 2025-07-31 ENCOUNTER — APPOINTMENT (OUTPATIENT)
Facility: CLINIC | Age: 80
End: 2025-07-31
Payer: MEDICARE

## 2025-09-04 ENCOUNTER — APPOINTMENT (OUTPATIENT)
Dept: ENDOCRINOLOGY | Facility: CLINIC | Age: 80
End: 2025-09-04
Payer: MEDICARE

## 2025-09-04 ASSESSMENT — ENCOUNTER SYMPTOMS
VOMITING: 0
APPETITE CHANGE: 0
POLYDIPSIA: 0
ABDOMINAL PAIN: 0
DIARRHEA: 0
CONSTIPATION: 0
FEVER: 0
HEADACHES: 0
NERVOUS/ANXIOUS: 0
NAUSEA: 0
FREQUENCY: 0
SHORTNESS OF BREATH: 0
COUGH: 0
ARTHRALGIAS: 0
SORE THROAT: 0

## 2025-10-21 ENCOUNTER — APPOINTMENT (OUTPATIENT)
Dept: PRIMARY CARE | Facility: CLINIC | Age: 80
End: 2025-10-21
Payer: MEDICARE

## 2026-02-02 ENCOUNTER — APPOINTMENT (OUTPATIENT)
Dept: NEPHROLOGY | Facility: CLINIC | Age: 81
End: 2026-02-02
Payer: MEDICARE

## 2026-02-10 ENCOUNTER — APPOINTMENT (OUTPATIENT)
Dept: ENDOCRINOLOGY | Facility: CLINIC | Age: 81
End: 2026-02-10
Payer: MEDICARE